# Patient Record
Sex: FEMALE | Race: WHITE | NOT HISPANIC OR LATINO | Employment: OTHER | URBAN - NONMETROPOLITAN AREA
[De-identification: names, ages, dates, MRNs, and addresses within clinical notes are randomized per-mention and may not be internally consistent; named-entity substitution may affect disease eponyms.]

---

## 2017-03-07 RX ORDER — FENOFIBRATE 160 MG/1
160 TABLET ORAL DAILY
COMMUNITY
End: 2022-04-11 | Stop reason: ALTCHOICE

## 2017-03-07 RX ORDER — ESOMEPRAZOLE MAGNESIUM 40 MG/1
40 CAPSULE, DELAYED RELEASE ORAL
COMMUNITY
End: 2019-12-17 | Stop reason: ALTCHOICE

## 2017-03-07 RX ORDER — FLUOXETINE HYDROCHLORIDE 40 MG/1
40 CAPSULE ORAL DAILY
COMMUNITY

## 2017-03-07 RX ORDER — MECLIZINE HCL 25MG 25 MG/1
25 TABLET, CHEWABLE ORAL 3 TIMES DAILY PRN
COMMUNITY

## 2017-03-07 RX ORDER — LEVOTHYROXINE AND LIOTHYRONINE 9.5; 2.25 UG/1; UG/1
15 TABLET ORAL DAILY
COMMUNITY
End: 2018-04-26 | Stop reason: DRUGHIGH

## 2017-03-07 RX ORDER — DIAPER,BRIEF,INFANT-TODD,DISP
EACH MISCELLANEOUS 2 TIMES DAILY
COMMUNITY
End: 2017-03-13

## 2017-03-07 RX ORDER — CARVEDILOL 12.5 MG/1
12.5 TABLET ORAL DAILY
COMMUNITY
End: 2020-06-18 | Stop reason: SDUPTHER

## 2017-03-07 RX ORDER — ASPIRIN 81 MG/1
81 TABLET ORAL DAILY
COMMUNITY

## 2017-03-07 RX ORDER — FUROSEMIDE 20 MG/1
20 TABLET ORAL AS NEEDED
COMMUNITY

## 2017-03-07 RX ORDER — TRAZODONE HYDROCHLORIDE 150 MG/1
100 TABLET ORAL NIGHTLY
COMMUNITY

## 2017-03-13 ENCOUNTER — OFFICE VISIT (OUTPATIENT)
Dept: CARDIOLOGY | Facility: CLINIC | Age: 57
End: 2017-03-13

## 2017-03-13 VITALS
HEIGHT: 69 IN | WEIGHT: 293 LBS | BODY MASS INDEX: 43.4 KG/M2 | HEART RATE: 73 BPM | DIASTOLIC BLOOD PRESSURE: 84 MMHG | OXYGEN SATURATION: 98 % | SYSTOLIC BLOOD PRESSURE: 140 MMHG

## 2017-03-13 DIAGNOSIS — E66.01 MORBID OBESITY DUE TO EXCESS CALORIES (HCC): ICD-10-CM

## 2017-03-13 DIAGNOSIS — E78.5 DYSLIPIDEMIA: ICD-10-CM

## 2017-03-13 DIAGNOSIS — I50.22 CHRONIC SYSTOLIC CONGESTIVE HEART FAILURE (HCC): Primary | ICD-10-CM

## 2017-03-13 DIAGNOSIS — I10 ESSENTIAL HYPERTENSION: ICD-10-CM

## 2017-03-13 PROBLEM — E03.9 HYPOTHYROID: Status: ACTIVE | Noted: 2017-03-13

## 2017-03-13 PROBLEM — R10.13 DYSPEPSIA: Status: ACTIVE | Noted: 2017-03-13

## 2017-03-13 PROBLEM — E11.9 DIABETES (HCC): Status: ACTIVE | Noted: 2017-03-13

## 2017-03-13 PROBLEM — F41.9 ANXIETY: Status: ACTIVE | Noted: 2017-03-13

## 2017-03-13 PROBLEM — F32.A DEPRESSION: Status: ACTIVE | Noted: 2017-03-13

## 2017-03-13 PROCEDURE — 99244 OFF/OP CNSLTJ NEW/EST MOD 40: CPT | Performed by: INTERNAL MEDICINE

## 2017-03-13 PROCEDURE — 93000 ELECTROCARDIOGRAM COMPLETE: CPT | Performed by: INTERNAL MEDICINE

## 2017-03-13 NOTE — PROGRESS NOTES
Reason for Visit: Establish cardiovascular care.    HPI:  Kacie Baron is a 57 y.o. female is here today as a transfer of care at the request of Dr Carlie Infante.  She was initially diagnosed with CHF in June 2013.  She was told her heart was slightly enlarged and weak.  It had later normalized on subsequent Echo.  She just moved to this area in December from [place], [place].  She has not had any recent heart problems that she is aware of.  She is tired intermittently, which is her only complaint.  She is unable to exercise much due to a medial meniscus tear.      Previous Cardiac Testing and Procedures:  - C (7/9/13) normal coronary arteries  - Lipid panel (12/2/16) 206/35/133/191    Patient Active Problem List   Diagnosis   • Depression   • Anxiety   • Morbid obesity due to excess calories   • Dyslipidemia   • Essential hypertension   • Dyspepsia   • Hypothyroid   • Diabetes       Social History   Substance Use Topics   • Smoking status: Never Smoker   • Smokeless tobacco: None   • Alcohol use No       Family History   Problem Relation Age of Onset   • Heart disease Mother        The following portions of the patient's history were reviewed and updated as appropriate: allergies, current medications, past family history, past medical history, past social history, past surgical history and problem list.      Current Outpatient Prescriptions:   •  aspirin 81 MG EC tablet, Take 81 mg by mouth Daily., Disp: , Rfl:   •  carvedilol (COREG) 12.5 MG tablet, Take 12.5 mg by mouth 2 (Two) Times a Day With Meals., Disp: , Rfl:   •  Cholecalciferol (VITAMIN D3) 5000 UNITS tablet, Take  by mouth., Disp: , Rfl:   •  esomeprazole (nexIUM) 40 MG capsule, Take 40 mg by mouth Every Morning Before Breakfast., Disp: , Rfl:   •  fenofibrate (TRICOR) 145 MG tablet, Take 145 mg by mouth Daily., Disp: , Rfl:   •  FLUoxetine (PROzac) 40 MG capsule, Take 40 mg by mouth Daily., Disp: , Rfl:   •  furosemide (LASIX) 20 MG tablet, Take  "20 mg by mouth 2 (Two) Times a Day., Disp: , Rfl:   •  meclizine 25 MG chewable tablet chewable tablet, Chew 25 mg 3 (Three) Times a Day As Needed., Disp: , Rfl:   •  metFORMIN (GLUCOPHAGE) 1000 MG tablet, Take 1,000 mg by mouth 2 (Two) Times a Day With Meals., Disp: , Rfl:   •  thyroid (ARMOUR) 15 MG tablet, Take 15 mg by mouth Daily., Disp: , Rfl:   •  traZODone (DESYREL) 75 MG half tablet, Take 75 mg by mouth Every Night., Disp: , Rfl:     Review of Systems   Constitution: Negative for chills, fever and weight loss.   HENT: Negative for headaches and sore throat.    Eyes: Negative for blurred vision and visual disturbance.   Cardiovascular: Negative for chest pain, dyspnea on exertion, leg swelling, palpitations, paroxysmal nocturnal dyspnea and syncope.   Respiratory: Negative for cough and shortness of breath.    Endocrine: Negative for cold intolerance and polyuria.   Skin: Negative for itching and rash.   Musculoskeletal: Negative for joint swelling and myalgias.   Gastrointestinal: Negative for abdominal pain, diarrhea and vomiting.   Genitourinary: Negative for dysuria and hematuria.   Neurological: Negative for dizziness and numbness.   Psychiatric/Behavioral: Negative for depression. The patient is not nervous/anxious.    Allergic/Immunologic: Negative for hives.       Objective   Visit Vitals   • /84 (BP Location: Right arm, Patient Position: Sitting, Cuff Size: Large Adult)   • Pulse 73   • Ht 68.5\" (174 cm)   • Wt (!) 309 lb (140 kg)   • SpO2 98%   • BMI 46.3 kg/m2     Physical Exam   Constitutional: She is oriented to person, place, and time. She appears well-developed and well-nourished.   HENT:   Head: Normocephalic and atraumatic.   Eyes: Conjunctivae and EOM are normal. Pupils are equal, round, and reactive to light.   Neck: Normal range of motion. Neck supple. No JVD present. No thyromegaly present.   Cardiovascular: Normal rate, regular rhythm and normal heart sounds.    No murmur " heard.  Pulmonary/Chest: Effort normal and breath sounds normal. She has no wheezes. She has no rales.   Abdominal: Soft. Bowel sounds are normal. She exhibits no distension. There is no tenderness.   Musculoskeletal: Normal range of motion. She exhibits no edema.   Neurological: She is alert and oriented to person, place, and time. Coordination normal.   Skin: Skin is warm and dry. No rash noted.   Psychiatric: She has a normal mood and affect. Her behavior is normal.       ECG 12 Lead  Date/Time: 3/13/2017 9:49 AM  Performed by: NELY LOPEZ  Authorized by: NELY LOPEZ   Previous ECG: no previous ECG available  Rhythm: sinus rhythm  Rate: normal  Clinical impression: normal ECG              ICD-10-CM ICD-9-CM   1. Chronic systolic congestive heart failure I50.22 428.22     428.0   2. Morbid obesity due to excess calories E66.01 278.01   3. Dyslipidemia E78.5 272.4   4. Essential hypertension I10 401.9         Assessment/Plan:  1. CHF: History of systolic CHF that reportedly normalized.  Requested records from her prior cardiologist in [place].       2. Dyslipidemia:  Currently managed on fenofibrate.  Not optimally controlled.  Will give trial of lifestyle modification.      3. Obesity:  BMI 46,  on diet, exercise, and weight loss.      4. Essential hypertension:  Blood pressure is borderline.  Will continue carvedilol and encourage exercise and lifestyle modification.

## 2017-10-16 ENCOUNTER — OFFICE VISIT (OUTPATIENT)
Dept: CARDIOLOGY | Facility: CLINIC | Age: 57
End: 2017-10-16

## 2017-10-16 VITALS
OXYGEN SATURATION: 98 % | DIASTOLIC BLOOD PRESSURE: 88 MMHG | SYSTOLIC BLOOD PRESSURE: 124 MMHG | BODY MASS INDEX: 43.4 KG/M2 | HEIGHT: 69 IN | WEIGHT: 293 LBS | HEART RATE: 89 BPM

## 2017-10-16 DIAGNOSIS — I10 ESSENTIAL HYPERTENSION: ICD-10-CM

## 2017-10-16 DIAGNOSIS — E66.01 MORBID OBESITY DUE TO EXCESS CALORIES (HCC): ICD-10-CM

## 2017-10-16 DIAGNOSIS — E78.5 DYSLIPIDEMIA: ICD-10-CM

## 2017-10-16 DIAGNOSIS — I50.9 CONGESTIVE HEART FAILURE, UNSPECIFIED CONGESTIVE HEART FAILURE CHRONICITY, UNSPECIFIED CONGESTIVE HEART FAILURE TYPE: Primary | ICD-10-CM

## 2017-10-16 PROCEDURE — 99214 OFFICE O/P EST MOD 30 MIN: CPT | Performed by: INTERNAL MEDICINE

## 2017-10-16 NOTE — PROGRESS NOTES
Reason for Visit: cardiovascular follow up.    HPI:  Kacie Baron is a 57 y.o. female is here today for follow-up.  She has done well for the most part.  She denies any chest pain, palpitations, dizziness, syncope, PND, or orthopnea.  Her only complaint is episodes of sweating.  She notes that even if it is cold outside she will break out in a sweat.  Her cholesterol remains elevated on the recheck.  She is very reluctant to go on statins due to her  who had significant muscle effects from them.  She once another try at lifestyle modification.    Previous Cardiac Testing and Procedures:  - LHC (7/9/13) normal coronary arteries  - Lipid panel (12/2/16) 206/35/133/191  - Lipid panel (10/10/17) 214/41/137/178      Patient Active Problem List   Diagnosis   • Depression   • Anxiety   • Morbid obesity due to excess calories   • Dyslipidemia   • Essential hypertension   • Dyspepsia   • Hypothyroid   • Diabetes       Social History   Substance Use Topics   • Smoking status: Never Smoker   • Smokeless tobacco: Never Used   • Alcohol use No       Family History   Problem Relation Age of Onset   • Heart disease Mother        The following portions of the patient's history were reviewed and updated as appropriate: allergies, current medications, past family history, past medical history, past social history, past surgical history and problem list.      Current Outpatient Prescriptions:   •  aspirin 81 MG EC tablet, Take 81 mg by mouth Daily., Disp: , Rfl:   •  carvedilol (COREG) 12.5 MG tablet, Take 12.5 mg by mouth 2 (Two) Times a Day With Meals., Disp: , Rfl:   •  Cholecalciferol (VITAMIN D3) 5000 UNITS tablet, Take  by mouth., Disp: , Rfl:   •  esomeprazole (nexIUM) 40 MG capsule, Take 40 mg by mouth Every Morning Before Breakfast., Disp: , Rfl:   •  fenofibrate (TRICOR) 145 MG tablet, Take 145 mg by mouth Daily., Disp: , Rfl:   •  FLUoxetine (PROzac) 40 MG capsule, Take 40 mg by mouth Daily., Disp: , Rfl:   •   "furosemide (LASIX) 20 MG tablet, Take 20 mg by mouth 2 (Two) Times a Day., Disp: , Rfl:   •  meclizine 25 MG chewable tablet chewable tablet, Chew 25 mg 3 (Three) Times a Day As Needed., Disp: , Rfl:   •  metFORMIN (GLUCOPHAGE) 1000 MG tablet, Take 1,000 mg by mouth 2 (Two) Times a Day With Meals., Disp: , Rfl:   •  thyroid (ARMOUR) 15 MG tablet, Take 15 mg by mouth Daily., Disp: , Rfl:   •  traZODone (DESYREL) 75 MG half tablet, Take 75 mg by mouth Every Night., Disp: , Rfl:     Review of Systems   Constitution: Negative for malaise/fatigue.   Eyes: Negative for blurred vision.   Cardiovascular: Negative for chest pain, orthopnea, palpitations and paroxysmal nocturnal dyspnea.   Respiratory: Negative for shortness of breath.    Musculoskeletal: Negative for neck pain.   Neurological: Negative for headaches.     Answers for HPI/ROS submitted by the patient on 10/9/2017   Hypertension  Chronicity: recurrent  Onset: more than 1 year ago  Progression since onset: waxing and waning  Condition status: controlled  anxiety: No  peripheral edema: No  sweats: Yes  Agents associated with hypertension: thyroid hormones  CAD risks: diabetes mellitus, dyslipidemia, family history, obesity, post-menopausal state, sedentary lifestyle  Compliance problems: diet, exercise    Objective   /88 (BP Location: Left arm, Patient Position: Sitting, Cuff Size: Large Adult)  Pulse 89  Ht 68.5\" (174 cm)  Wt (!) 303 lb (137 kg)  SpO2 98%  BMI 45.4 kg/m2  Physical Exam   Constitutional: She is oriented to person, place, and time. She appears well-developed and well-nourished.   HENT:   Head: Normocephalic and atraumatic.   Cardiovascular: Normal rate, regular rhythm and normal heart sounds.    No murmur heard.  Pulmonary/Chest: Effort normal and breath sounds normal.   Musculoskeletal: She exhibits no edema.   Neurological: She is alert and oriented to person, place, and time.   Skin: Skin is warm and dry.   Psychiatric: She has a " normal mood and affect.     Procedures      ICD-10-CM ICD-9-CM   1. Congestive heart failure, unspecified congestive heart failure chronicity, unspecified congestive heart failure type I50.9 428.0   2. Dyslipidemia E78.5 272.4   3. Morbid obesity due to excess calories E66.01 278.01   4. Essential hypertension I10 401.9         Assessment/Plan:  1. CHF: History of systolic CHF that reportedly normalized.   will again request echo records from her prior cardiologist in [place].    continue carvedilol.     2. Dyslipidemia:  Currently managed on fenofibrate and remains not optimally controlled.   on lifestyle modification.  Patient will try Mediterranean-style diet along with exercise.  If no improvement will consider statin therapy.     3. Obesity:  BMI 45.  Weight is down 6 pounds from last visit.  Continue to  on diet, exercise, and weight loss.       4. Essential hypertension:   Diastolic blood pressure is borderline today.  We will continue lifestyle modification and monitor.

## 2018-01-18 ENCOUNTER — TRANSCRIBE ORDERS (OUTPATIENT)
Dept: ADMINISTRATIVE | Facility: HOSPITAL | Age: 58
End: 2018-01-18

## 2018-01-18 DIAGNOSIS — Z12.31 ENCOUNTER FOR SCREENING MAMMOGRAM FOR MALIGNANT NEOPLASM OF BREAST: Primary | ICD-10-CM

## 2018-01-24 ENCOUNTER — APPOINTMENT (OUTPATIENT)
Dept: MAMMOGRAPHY | Facility: HOSPITAL | Age: 58
End: 2018-01-24

## 2018-02-16 ENCOUNTER — HOSPITAL ENCOUNTER (OUTPATIENT)
Dept: MAMMOGRAPHY | Facility: HOSPITAL | Age: 58
Discharge: HOME | End: 2018-02-16
Admitting: NURSE PRACTITIONER

## 2018-02-16 ENCOUNTER — TRANSCRIBE ORDERS (OUTPATIENT)
Dept: ADMINISTRATIVE | Facility: HOSPITAL | Age: 58
End: 2018-02-16

## 2018-02-16 ENCOUNTER — HOSPITAL ENCOUNTER (OUTPATIENT)
Dept: ULTRASOUND IMAGING | Facility: HOSPITAL | Age: 58
Discharge: HOME | End: 2018-02-16

## 2018-02-16 DIAGNOSIS — M76.62 TENDONITIS, ACHILLES, LEFT: Primary | ICD-10-CM

## 2018-02-16 DIAGNOSIS — M76.62 TENDONITIS, ACHILLES, LEFT: ICD-10-CM

## 2018-02-16 DIAGNOSIS — Z12.31 ENCOUNTER FOR SCREENING MAMMOGRAM FOR MALIGNANT NEOPLASM OF BREAST: ICD-10-CM

## 2018-02-16 PROCEDURE — 77067 SCR MAMMO BI INCL CAD: CPT

## 2018-02-16 PROCEDURE — 77063 BREAST TOMOSYNTHESIS BI: CPT

## 2018-02-16 PROCEDURE — 76882 US LMTD JT/FCL EVL NVASC XTR: CPT

## 2018-04-26 ENCOUNTER — OFFICE VISIT (OUTPATIENT)
Dept: CARDIOLOGY | Facility: CLINIC | Age: 58
End: 2018-04-26

## 2018-04-26 VITALS
SYSTOLIC BLOOD PRESSURE: 135 MMHG | HEIGHT: 68 IN | WEIGHT: 293 LBS | RESPIRATION RATE: 18 BRPM | BODY MASS INDEX: 44.41 KG/M2 | HEART RATE: 77 BPM | DIASTOLIC BLOOD PRESSURE: 80 MMHG

## 2018-04-26 DIAGNOSIS — I10 ESSENTIAL HYPERTENSION: Primary | ICD-10-CM

## 2018-04-26 DIAGNOSIS — E78.5 DYSLIPIDEMIA: ICD-10-CM

## 2018-04-26 DIAGNOSIS — E11.9 TYPE 2 DIABETES MELLITUS WITHOUT COMPLICATION, WITHOUT LONG-TERM CURRENT USE OF INSULIN (HCC): ICD-10-CM

## 2018-04-26 DIAGNOSIS — E66.01 MORBID OBESITY DUE TO EXCESS CALORIES (HCC): ICD-10-CM

## 2018-04-26 PROCEDURE — 93000 ELECTROCARDIOGRAM COMPLETE: CPT | Performed by: NURSE PRACTITIONER

## 2018-04-26 PROCEDURE — 99214 OFFICE O/P EST MOD 30 MIN: CPT | Performed by: NURSE PRACTITIONER

## 2018-04-26 NOTE — PROGRESS NOTES
"    Subjective:     Encounter Date:04/26/2018      Patient ID: Kacie Baron is a 58 y.o. female.    Chief Complaint:  The patient reports she is feeling well overall. She admits occasional drops in her blood pressure to 100s/50s, typically 1-2 hours after taking her blood pressure medication. She has some mild lightheadedness with this. She admits worsening lower extremity edema during the summer time, but none presently. She denies chest pain, shortness of breath, palpitations, sudden weight gain, orthopnea, PND or syncope. SHe reports she cannot exercise very much due to trouble with her left ankle.       Congestive Heart Failure   Presents for follow-up visit. Pertinent negatives include no abdominal pain, chest pain, chest pressure, claudication, edema, fatigue, muscle weakness, near-syncope, palpitations, paroxysmal nocturnal dyspnea, shortness of breath or unexpected weight change. The symptoms have been stable.       The following portions of the patient's history were reviewed and updated as appropriate: allergies, current medications, past family history, past medical history, past social history, past surgical history and problem list.  /80 (BP Location: Right arm, Patient Position: Sitting, Cuff Size: Adult)   Pulse 77   Resp 18   Ht 172.7 cm (68\")   Wt (!) 140 kg (308 lb)   Breastfeeding? No   BMI 46.83 kg/m²   Allergies   Allergen Reactions   • Lisinopril Nausea Only       Current Outpatient Prescriptions:   •  aspirin 81 MG EC tablet, Take 81 mg by mouth Daily., Disp: , Rfl:   •  carvedilol (COREG) 12.5 MG tablet, Take 12.5 mg by mouth 2 (Two) Times a Day With Meals., Disp: , Rfl:   •  Cholecalciferol (VITAMIN D3) 5000 UNITS tablet, Take  by mouth., Disp: , Rfl:   •  esomeprazole (nexIUM) 40 MG capsule, Take 40 mg by mouth Every Morning Before Breakfast., Disp: , Rfl:   •  fenofibrate (TRICOR) 145 MG tablet, Take 145 mg by mouth Daily., Disp: , Rfl:   •  FLUoxetine (PROzac) 40 MG capsule, " Take 40 mg by mouth Daily., Disp: , Rfl:   •  furosemide (LASIX) 20 MG tablet, Take 20 mg by mouth As Needed., Disp: , Rfl:   •  meclizine 25 MG chewable tablet chewable tablet, Chew 25 mg 3 (Three) Times a Day As Needed., Disp: , Rfl:   •  metFORMIN (GLUCOPHAGE) 1000 MG tablet, Take 1,000 mg by mouth 2 (Two) Times a Day With Meals., Disp: , Rfl:   •  Thyroid 90 MG PO tablet, Take 90 mg by mouth Daily., Disp: , Rfl:   •  traZODone (DESYREL) 75 MG half tablet, Take 75 mg by mouth Every Night., Disp: , Rfl:   Past Medical History:   Diagnosis Date   • Arthritis    • CHF (congestive heart failure)    • Depression    • DM (diabetes mellitus screen)    • GERD (gastroesophageal reflux disease)    • HLD (hyperlipidemia)    • HTN (hypertension)    • Thyroid condition      Past Surgical History:   Procedure Laterality Date   • BACK SURGERY      2007   • CARDIAC CATHETERIZATION     • COLONOSCOPY      2016   • CYST REMOVAL     • DILATATION AND CURETTAGE     • TONSILLECTOMY AND ADENOIDECTOMY     • TUBAL ABDOMINAL LIGATION       Social History     Social History   • Marital status: Unknown     Spouse name: N/A   • Number of children: N/A   • Years of education: N/A     Occupational History   • Not on file.     Social History Main Topics   • Smoking status: Never Smoker   • Smokeless tobacco: Never Used   • Alcohol use No   • Drug use: Unknown   • Sexual activity: Yes     Partners: Male     Other Topics Concern   • Not on file     Social History Narrative   • No narrative on file     Family History   Problem Relation Age of Onset   • Heart disease Mother    • Breast cancer Maternal Grandmother    • Breast cancer Paternal Grandmother        Review of Systems   Constitution: Negative for chills, diaphoresis, fatigue, fever, weakness and unexpected weight change.   HENT: Negative for nosebleeds.    Eyes: Negative for visual disturbance.   Cardiovascular: Negative for chest pain, claudication, cyanosis, dyspnea on exertion, irregular  heartbeat, leg swelling, near-syncope, orthopnea, palpitations, paroxysmal nocturnal dyspnea and syncope.   Respiratory: Negative for cough, hemoptysis, shortness of breath, sputum production and wheezing.    Hematologic/Lymphatic: Negative for bleeding problem.   Skin: Negative for color change and flushing.   Musculoskeletal: Negative for falls and muscle weakness.   Gastrointestinal: Negative for bloating, abdominal pain, hematemesis, hematochezia, melena, nausea and vomiting.   Genitourinary: Negative for hematuria.   Neurological: Positive for light-headedness (occasional with low blood pressure ). Negative for dizziness.   Psychiatric/Behavioral: Negative for altered mental status.         ECG 12 Lead  Date/Time: 4/26/2018 9:39 AM  Performed by: WES SALCEDO  Authorized by: WES SALCEDO   Comparison: compared with previous ECG from 3/13/2017  Similar to previous ECG  Rhythm: sinus rhythm               Objective:     Physical Exam   Constitutional: She is oriented to person, place, and time. She appears well-developed and well-nourished. No distress.   HENT:   Head: Normocephalic and atraumatic.   Eyes: Pupils are equal, round, and reactive to light.   Neck: Normal range of motion. Neck supple. No JVD present. No thyromegaly present.   Cardiovascular: Normal rate, regular rhythm, normal heart sounds and intact distal pulses.  Exam reveals no gallop and no friction rub.    No murmur heard.  Pulmonary/Chest: Effort normal and breath sounds normal. No respiratory distress. She has no wheezes. She has no rales. She exhibits no tenderness.   Abdominal: Soft. Bowel sounds are normal. She exhibits no distension. There is no tenderness.   Musculoskeletal: Normal range of motion. She exhibits no edema.   Neurological: She is alert and oriented to person, place, and time. No cranial nerve deficit.   Skin: Skin is warm and dry. She is not diaphoretic.   Psychiatric: She has a normal mood and affect. Her behavior is  normal.     Patient's Body mass index is 46.83 kg/m². BMI is above normal parameters. Follow-up plan includes:  exercise counseling and nutrition counseling.  Lab Review:       Assessment:          Diagnosis Plan   1. Essential hypertension  Controlled per home readings, occasional low blood pressures- encouraged hydration, orthostatic precautions reviewed     2. Morbid obesity due to excess calories  Counseled on the importance and benefits of weight loss, heart healthy diet, and exercise   3. Type 2 diabetes mellitus without complication, without long-term current use of insulin  Followed by pcp    4. Dyslipidemia  Remains uncontrolled. She reports she did not tolerate lipitor 20 mg daily due to upset stomach. Repeat lipid panel today- if she remains uncontrolled will try different statin, possibly pravastatin      5. Reported history of systolic CHF/non ischemic cardiomyopathy- she reports she had a normal heart cath in [place], [place] at [facility] per Dr. Jonnathan Turner- will request records again. LVEF normalized per 2014 echo- LVEF 60%. No current s/s of CHF.      Plan:       As noted above.  Continue ASA, coreg, PRN lasix, tricor. She is allergic to ACE inhibitors.   Check lipid panel     Reviewed signs and symptoms of CHF and what to report with the patient. Patient instructed to restrict sodium and weigh daily. Report weight gain of greater than 2 lbs overnight or 5 lbs in 1 week. Pt verbalized understanding of instructions and plan of care.

## 2018-04-26 NOTE — PATIENT INSTRUCTIONS
"Heart-Healthy Eating Plan  Heart-healthy meal planning includes:  · Limiting unhealthy fats.  · Increasing healthy fats.  · Making other small dietary changes.  You may need to talk with your doctor or a diet specialist (dietitian) to create an eating plan that is right for you.  What types of fat should I choose?  · Choose healthy fats. These include olive oil and canola oil, flaxseeds, walnuts, almonds, and seeds.  · Eat more omega-3 fats. These include salmon, mackerel, sardines, tuna, flaxseed oil, and ground flaxseeds. Try to eat fish at least twice each week.  · Limit saturated fats.  ¨ Saturated fats are often found in animal products, such as meats, butter, and cream.  ¨ Plant sources of saturated fats include palm oil, palm kernel oil, and coconut oil.  · Avoid foods with partially hydrogenated oils in them. These include stick margarine, some tub margarines, cookies, crackers, and other baked goods. These contain trans fats.  What general guidelines do I need to follow?  · Check food labels carefully. Identify foods with trans fats or high amounts of saturated fat.  · Fill one half of your plate with vegetables and green salads. Eat 4-5 servings of vegetables per day. A serving of vegetables is:  ¨ 1 cup of raw leafy vegetables.  ¨ ½ cup of raw or cooked cut-up vegetables.  ¨ ½ cup of vegetable juice.  · Fill one fourth of your plate with whole grains. Look for the word \"whole\" as the first word in the ingredient list.  · Fill one fourth of your plate with lean protein foods.  · Eat 4-5 servings of fruit per day. A serving of fruit is:  ¨ One medium whole fruit.  ¨ ¼ cup of dried fruit.  ¨ ½ cup of fresh, frozen, or canned fruit.  ¨ ½ cup of 100% fruit juice.  · Eat more foods that contain soluble fiber. These include apples, broccoli, carrots, beans, peas, and barley. Try to get 20-30 g of fiber per day.  · Eat more home-cooked food. Eat less restaurant, buffet, and fast food.  · Limit or avoid " alcohol.  · Limit foods high in starch and sugar.  · Avoid fried foods.  · Avoid frying your food. Try baking, boiling, grilling, or broiling it instead. You can also reduce fat by:  ¨ Removing the skin from poultry.  ¨ Removing all visible fats from meats.  ¨ Skimming the fat off of stews, soups, and gravies before serving them.  ¨ Steaming vegetables in water or broth.  · Lose weight if you are overweight.  · Eat 4-5 servings of nuts, legumes, and seeds per week:  ¨ One serving of dried beans or legumes equals ½ cup after being cooked.  ¨ One serving of nuts equals 1½ ounces.  ¨ One serving of seeds equals ½ ounce or one tablespoon.  · You may need to keep track of how much salt or sodium you eat. This is especially true if you have high blood pressure. Talk with your doctor or dietitian to get more information.  What foods can I eat?  Grains   Breads, including Scottish, white, marshal, wheat, raisin, rye, oatmeal, and Italian. Tortillas that are neither fried nor made with lard or trans fat. Low-fat rolls, including hotdog and hamburger buns and English muffins. Biscuits. Muffins. Waffles. Pancakes. Light popcorn. Whole-grain cereals. Flatbread. [place] toast. Pretzels. Breadsticks. Rusks. Low-fat snacks. Low-fat crackers, including oyster, saltine, matzo, geraldo, animal, and rye. Rice and pasta, including brown rice and pastas that are made with whole wheat.  Vegetables   All vegetables.  Fruits   All fruits, but limit coconut.  Meats and Other Protein Sources   Lean, well-trimmed beef, veal, pork, and lamb. Chicken and turkey without skin. All fish and shellfish. Wild duck, rabbit, pheasant, and venison. Egg whites or low-cholesterol egg substitutes. Dried beans, peas, lentils, and tofu. Seeds and most nuts.  Dairy   Low-fat or nonfat cheeses, including ricotta, string, and mozzarella. Skim or 1% milk that is liquid, powdered, or evaporated. Buttermilk that is made with low-fat milk. Nonfat or low-fat  yogurt.  Beverages   Mineral water. Diet carbonated beverages.  Sweets and Desserts   Sherbets and fruit ices. Honey, jam, marmalade, jelly, and syrups. Meringues and gelatins. Pure sugar candy, such as hard candy, jelly beans, gumdrops, mints, marshmallows, and small amounts of dark chocolate. Jhon food cake.  Eat all sweets and desserts in moderation.  Fats and Oils   Nonhydrogenated (trans-free) margarines. Vegetable oils, including soybean, sesame, sunflower, olive, peanut, safflower, corn, canola, and cottonseed. Salad dressings or mayonnaise made with a vegetable oil. Limit added fats and oils that you use for cooking, baking, salads, and as spreads.  Other   Cocoa powder. Coffee and tea. All seasonings and condiments.  The items listed above may not be a complete list of recommended foods or beverages. Contact your dietitian for more options.   What foods are not recommended?  Grains   Breads that are made with saturated or trans fats, oils, or whole milk. Croissants. Butter rolls. Cheese breads. Sweet rolls. Donuts. Buttered popcorn. Chow mein noodles. High-fat crackers, such as cheese or butter crackers.  Meats and Other Protein Sources   Fatty meats, such as hotdogs, short ribs, sausage, spareribs, cortes, rib eye roast or steak, and mutton. High-fat deli meats, such as salami and bologna. Caviar. Domestic duck and goose. Organ meats, such as kidney, liver, sweetbreads, and heart.  Dairy   Cream, sour cream, cream cheese, and creamed cottage cheese. Whole-milk cheeses, including blue (anayeli), [place], Brie, Kareem, American, Havarti, Swiss, cheddar, Camembert, and [place]. Whole or 2% milk that is liquid, evaporated, or condensed. Whole buttermilk. Cream sauce or high-fat cheese sauce. Yogurt that is made from whole milk.  Beverages   Regular sodas and juice drinks with added sugar.  Sweets and Desserts   Frosting. Pudding. Cookies. Cakes other than jhon food cake. Candy that has milk chocolate or  white chocolate, hydrogenated fat, butter, coconut, or unknown ingredients. Buttered syrups. Full-fat ice cream or ice cream drinks.  Fats and Oils   Gravy that has suet, meat fat, or shortening. Cocoa butter, hydrogenated oils, palm oil, coconut oil, palm kernel oil. These can often be found in baked products, candy, fried foods, nondairy creamers, and whipped toppings. Solid fats and shortenings, including cortes fat, salt pork, lard, and butter. Nondairy cream substitutes, such as coffee creamers and sour cream substitutes. Salad dressings that are made of unknown oils, cheese, or sour cream.  The items listed above may not be a complete list of foods and beverages to avoid. Contact your dietitian for more information.   This information is not intended to replace advice given to you by your health care provider. Make sure you discuss any questions you have with your health care provider.  Document Released: 06/18/2013 Document Revised: 05/25/2017 Document Reviewed: 06/11/2015  [employer] Interactive Patient Education © 2017 [employer]

## 2018-10-26 ENCOUNTER — OFFICE VISIT (OUTPATIENT)
Dept: CARDIOLOGY | Facility: CLINIC | Age: 58
End: 2018-10-26

## 2018-10-26 VITALS
WEIGHT: 293 LBS | SYSTOLIC BLOOD PRESSURE: 130 MMHG | DIASTOLIC BLOOD PRESSURE: 86 MMHG | OXYGEN SATURATION: 98 % | HEART RATE: 86 BPM | HEIGHT: 69 IN | BODY MASS INDEX: 43.4 KG/M2

## 2018-10-26 DIAGNOSIS — I10 ESSENTIAL HYPERTENSION: Primary | ICD-10-CM

## 2018-10-26 DIAGNOSIS — E78.5 DYSLIPIDEMIA: ICD-10-CM

## 2018-10-26 DIAGNOSIS — E66.01 MORBID OBESITY DUE TO EXCESS CALORIES (HCC): ICD-10-CM

## 2018-10-26 DIAGNOSIS — E11.9 TYPE 2 DIABETES MELLITUS WITHOUT COMPLICATION, WITHOUT LONG-TERM CURRENT USE OF INSULIN (HCC): ICD-10-CM

## 2018-10-26 DIAGNOSIS — G47.39 OTHER SLEEP APNEA: ICD-10-CM

## 2018-10-26 PROCEDURE — 93000 ELECTROCARDIOGRAM COMPLETE: CPT | Performed by: NURSE PRACTITIONER

## 2018-10-26 PROCEDURE — 99214 OFFICE O/P EST MOD 30 MIN: CPT | Performed by: NURSE PRACTITIONER

## 2018-10-26 RX ORDER — LOSARTAN POTASSIUM 25 MG/1
25 TABLET ORAL DAILY
COMMUNITY
End: 2019-12-17 | Stop reason: SDUPTHER

## 2018-10-26 RX ORDER — HYDROCORTISONE, IODOQUINOL 10; 10 MG/G; MG/G
1 CREAM TOPICAL EVERY 12 HOURS SCHEDULED
COMMUNITY
End: 2019-04-24

## 2018-10-26 NOTE — PROGRESS NOTES
Subjective:     Encounter Date:10/26/2018      Patient ID: Kacie Baron is a 58 y.o. female with a history of hypertension, diabetes mellitus, dyslipidemia, obstructive sleep apnea, and obesity. She presents today for follow up.     Chief Complaint: Follow up  Palpitations    This is a new problem. The current episode started more than 1 month ago. The problem occurs intermittently. The problem has been gradually worsening. Associated symptoms include malaise/fatigue. Pertinent negatives include no chest pain, diaphoresis, dizziness, nausea, near-syncope, shortness of breath, syncope or vomiting. Risk factors include diabetes mellitus, dyslipidemia and obesity.   Fatigue   This is a chronic problem. The problem occurs daily. The problem has been gradually worsening. Associated symptoms include fatigue. Pertinent negatives include no abdominal pain, chest pain, diaphoresis, headaches, joint swelling, nausea or vomiting. The symptoms are aggravated by eating. She has tried rest for the symptoms.     Ms. Baron presents today for routine follow up. She complains of fatigue that is worse after eating meals. She has not had any other work up recently for this complaint. She is due to see her PCP next month. She has only recently had labs to check her HgbA1c which was 7.3.  She denies chest pain or discomfort, shortness of breath, dyspnea on exertion, dizziness, headaches, or lightheadedness.   She has been trying to lose weight with dieting. She monitors her blood pressure at home and states that it is well controlled.     The following portions of the patient's history were reviewed and updated as appropriate: allergies, current medications, past family history, past medical history, past social history and past surgical history.     Allergies   Allergen Reactions   • Lisinopril Nausea Only       Current Outpatient Prescriptions:   •  aspirin 81 MG EC tablet, Take 81 mg by mouth Daily., Disp: , Rfl:   •  carvedilol  (COREG) 12.5 MG tablet, Take 12.5 mg by mouth 2 (Two) Times a Day With Meals., Disp: , Rfl:   •  Cholecalciferol (VITAMIN D3) 5000 UNITS tablet, Take  by mouth., Disp: , Rfl:   •  esomeprazole (nexIUM) 40 MG capsule, Take 40 mg by mouth Every Morning Before Breakfast., Disp: , Rfl:   •  fenofibrate (TRICOR) 145 MG tablet, Take 145 mg by mouth Daily., Disp: , Rfl:   •  FLUoxetine (PROzac) 40 MG capsule, Take 40 mg by mouth Daily., Disp: , Rfl:   •  furosemide (LASIX) 20 MG tablet, Take 20 mg by mouth As Needed., Disp: , Rfl:   •  Iodoquinol-HC (HYDROCORTISONE-IODOQUINONE) 1-1 % cream cream, Apply 1 application topically to the appropriate area as directed Every 12 (Twelve) Hours., Disp: , Rfl:   •  losartan (COZAAR) 25 MG tablet, Take 25 mg by mouth Daily., Disp: , Rfl:   •  meclizine 25 MG chewable tablet chewable tablet, Chew 25 mg 3 (Three) Times a Day As Needed., Disp: , Rfl:   •  metFORMIN (GLUCOPHAGE) 1000 MG tablet, Take 1,000 mg by mouth 2 (Two) Times a Day With Meals., Disp: , Rfl:   •  Thyroid 90 MG PO tablet, Take 90 mg by mouth Daily., Disp: , Rfl:   •  traZODone (DESYREL) 75 MG half tablet, Take 100 mg by mouth Every Night., Disp: , Rfl:     Past Medical History:   Diagnosis Date   • Arthritis    • CHF (congestive heart failure) (CMS/HCC)    • Depression    • DM (diabetes mellitus screen)    • GERD (gastroesophageal reflux disease)    • HLD (hyperlipidemia)    • HTN (hypertension)    • Thyroid condition      Family History   Problem Relation Age of Onset   • Heart disease Mother    • Breast cancer Maternal Grandmother    • Breast cancer Paternal Grandmother      Social History     Social History   • Marital status: Unknown     Spouse name: N/A   • Number of children: N/A   • Years of education: N/A     Occupational History   • Not on file.     Social History Main Topics   • Smoking status: Never Smoker   • Smokeless tobacco: Never Used   • Alcohol use No   • Drug use: Unknown   • Sexual activity: Yes      Partners: Male     Other Topics Concern   • Not on file     Social History Narrative   • No narrative on file     Past Surgical History:   Procedure Laterality Date   • BACK SURGERY      2007   • CARDIAC CATHETERIZATION     • COLONOSCOPY      2016   • CYST REMOVAL     • DILATATION AND CURETTAGE     • TONSILLECTOMY AND ADENOIDECTOMY     • TUBAL ABDOMINAL LIGATION       Review of Systems   Constitution: Positive for fatigue and malaise/fatigue. Negative for diaphoresis.   Cardiovascular: Positive for palpitations. Negative for chest pain, dyspnea on exertion, leg swelling, near-syncope, orthopnea, paroxysmal nocturnal dyspnea and syncope.   Respiratory: Negative for shortness of breath and wheezing.    Hematologic/Lymphatic: Negative for bleeding problem.   Skin: Negative for dry skin and flushing.   Musculoskeletal: Positive for joint pain. Negative for joint swelling.   Gastrointestinal: Negative for bloating, abdominal pain, nausea and vomiting.   Neurological: Negative for dizziness, headaches and light-headedness.   Psychiatric/Behavioral: Negative for altered mental status.   All other systems reviewed and are negative.        ECG 12 Lead  Date/Time: 10/26/2018 9:44 AM  Performed by: JOSE BRUCE  Authorized by: JOSE BRUCE   Comparison: compared with previous ECG from 4/26/2018  Rhythm: sinus rhythm  Rate: normal  BPM: 82  Conduction: conduction normal  T depression: V3, V2, V4 and V5  QRS axis: left  Clinical impression: abnormal ECG          Vitals:    10/26/18 0912   BP: 130/86   Pulse: 86   SpO2: 98%     1    10/26/18  0912   Weight: (!) 138 kg (304 lb)          Objective:     Physical Exam   Constitutional: She is oriented to person, place, and time. She appears well-developed and well-nourished. No distress.   HENT:   Head: Normocephalic and atraumatic.   Nose: Nose normal.   Mouth/Throat: Oropharynx is clear and moist. No oropharyngeal exudate.   Eyes: Conjunctivae are normal. No scleral icterus.    Neck: Normal range of motion. Neck supple.   Cardiovascular: Normal rate, regular rhythm and normal heart sounds.  Exam reveals no gallop and no friction rub.    No murmur heard.  Pulmonary/Chest: Effort normal and breath sounds normal. No respiratory distress. She has no wheezes. She has no rales.   Abdominal: Soft. Normal appearance. She exhibits no distension. There is no tenderness.   Musculoskeletal: Normal range of motion. She exhibits no edema.   Neurological: She is alert and oriented to person, place, and time.   Skin: Skin is warm, dry and intact. No rash noted. She is not diaphoretic. No erythema. No pallor.   Psychiatric: She has a normal mood and affect. Her behavior is normal.   Vitals reviewed.      Lab Review: reviewed cath records from 2013 - nl cors. Nl LV function      Assessment:          Diagnosis Plan   1. Essential hypertension     2. Dyslipidemia     3. Type 2 diabetes mellitus without complication, without long-term current use of insulin (CMS/[facility])     4. Morbid obesity due to excess calories (CMS/[facility])     5. Other sleep apnea            Plan:       - HTN: well controlled with home medication regimen. Continue same. Managed per PCP.    - Dyslipidemia: managed per PCP. On Tricor. Issues with intolerance to lipitor; has not tried other statins. Has lipid panel with PCP office soon.    - DM: reports elevated HgbA1c: on metformin. Managed per PCP    - Obesity: discussed diet and weight loss    - sleep apnea: compliant with mask at home.     RTC: follow up in 6 months.

## 2018-10-26 NOTE — PATIENT INSTRUCTIONS

## 2019-04-24 ENCOUNTER — OFFICE VISIT (OUTPATIENT)
Dept: CARDIOLOGY | Facility: CLINIC | Age: 59
End: 2019-04-24

## 2019-04-24 VITALS
DIASTOLIC BLOOD PRESSURE: 90 MMHG | HEIGHT: 68 IN | BODY MASS INDEX: 44.41 KG/M2 | HEART RATE: 67 BPM | WEIGHT: 293 LBS | SYSTOLIC BLOOD PRESSURE: 138 MMHG

## 2019-04-24 DIAGNOSIS — G47.39 OTHER SLEEP APNEA: ICD-10-CM

## 2019-04-24 DIAGNOSIS — E78.2 MIXED HYPERLIPIDEMIA: ICD-10-CM

## 2019-04-24 DIAGNOSIS — I10 ESSENTIAL HYPERTENSION: Primary | ICD-10-CM

## 2019-04-24 DIAGNOSIS — E66.01 CLASS 3 SEVERE OBESITY DUE TO EXCESS CALORIES WITH SERIOUS COMORBIDITY AND BODY MASS INDEX (BMI) OF 45.0 TO 49.9 IN ADULT (HCC): ICD-10-CM

## 2019-04-24 DIAGNOSIS — Z86.79 HISTORY OF CONGESTIVE HEART FAILURE IN ADULTHOOD: ICD-10-CM

## 2019-04-24 DIAGNOSIS — E11.8 TYPE 2 DIABETES MELLITUS WITH COMPLICATION, WITHOUT LONG-TERM CURRENT USE OF INSULIN (HCC): ICD-10-CM

## 2019-04-24 PROBLEM — I50.9 CHF (CONGESTIVE HEART FAILURE) (HCC): Status: ACTIVE | Noted: 2019-04-24

## 2019-04-24 PROBLEM — E78.5 HLD (HYPERLIPIDEMIA): Status: ACTIVE | Noted: 2019-04-24

## 2019-04-24 PROBLEM — E66.813 CLASS 3 SEVERE OBESITY DUE TO EXCESS CALORIES WITH SERIOUS COMORBIDITY AND BODY MASS INDEX (BMI) OF 45.0 TO 49.9 IN ADULT: Status: ACTIVE | Noted: 2017-03-13

## 2019-04-24 PROCEDURE — 93000 ELECTROCARDIOGRAM COMPLETE: CPT | Performed by: NURSE PRACTITIONER

## 2019-04-24 PROCEDURE — 99213 OFFICE O/P EST LOW 20 MIN: CPT | Performed by: NURSE PRACTITIONER

## 2019-04-24 NOTE — PATIENT INSTRUCTIONS
Obesity, Adult  Obesity is the condition of having too much total body fat. Being overweight or obese means that your weight is greater than what is considered healthy for your body size. Obesity is determined by a measurement called BMI. BMI is an estimate of body fat and is calculated from height and weight. For adults, a BMI of 30 or higher is considered obese.  Obesity can eventually lead to other health concerns and major illnesses, including:  · Stroke.  · Coronary artery disease (CAD).  · Type 2 diabetes.  · Some types of cancer, including cancers of the colon, breast, uterus, and gallbladder.  · Osteoarthritis.  · High blood pressure (hypertension).  · High cholesterol.  · Sleep apnea.  · Gallbladder stones.  · Infertility problems.    What are the causes?  The main cause of obesity is taking in (consuming) more calories than your body uses for energy. Other factors that contribute to this condition may include:  · Being born with genes that make you more likely to become obese.  · Having a medical condition that causes obesity. These conditions include:  ? Hypothyroidism.  ? Polycystic ovarian syndrome (PCOS).  ? Binge-eating disorder.  ? Cushing syndrome.  · Taking certain medicines, such as steroids, antidepressants, and seizure medicines.  · Not being physically active (sedentary lifestyle).  · Living where there are limited places to exercise safely or buy healthy foods.  · Not getting enough sleep.    What increases the risk?  The following factors may increase your risk of this condition:  · Having a family history of obesity.  · Being a woman of -American descent.  · Being a man of  descent.    What are the signs or symptoms?  Having excessive body fat is the main symptom of this condition.  How is this diagnosed?  This condition may be diagnosed based on:  · Your symptoms.  · Your medical history.  · A physical exam. Your health care provider may measure:  ? Your BMI. If you are an  adult with a BMI between 25 and less than 30, you are considered overweight. If you are an adult with a BMI of 30 or higher, you are considered obese.  ? The distances around your hips and your waist (circumferences). These may be compared to each other to help diagnose your condition.  ? Your skinfold thickness. Your health care provider may gently pinch a fold of your skin and measure it.    How is this treated?  Treatment for this condition often includes changing your lifestyle. Treatment may include some or all of the following:  · Dietary changes. Work with your health care provider and a dietitian to set a weight-loss goal that is healthy and reasonable for you. Dietary changes may include eating:  ? Smaller portions. A portion size is the amount of a particular food that is healthy for you to eat at one time. This varies from person to person.  ? Low-calorie or low-fat options.  ? More whole grains, fruits, and vegetables.  · Regular physical activity. This may include aerobic activity (cardio) and strength training.  · Medicine to help you lose weight. Your health care provider may prescribe medicine if you are unable to lose 1 pound a week after 6 weeks of eating more healthily and doing more physical activity.  · Surgery. Surgical options may include gastric banding and gastric bypass. Surgery may be done if:  ? Other treatments have not helped to improve your condition.  ? You have a BMI of 40 or higher.  ? You have life-threatening health problems related to obesity.    Follow these instructions at home:    Eating and drinking    · Follow recommendations from your health care provider about what you eat and drink. Your health care provider may advise you to:  ? Limit fast foods, sweets, and processed snack foods.  ? Choose low-fat options, such as low-fat milk instead of whole milk.  ? Eat 5 or more servings of fruits or vegetables every day.  ? Eat at home more often. This gives you more control over  what you eat.  ? Choose healthy foods when you eat out.  ? Learn what a healthy portion size is.  ? Keep low-fat snacks on hand.  ? Avoid sugary drinks, such as soda, fruit juice, iced tea sweetened with sugar, and flavored milk.  ? Eat a healthy breakfast.  · Drink enough water to keep your urine clear or pale yellow.  · Do not go without eating for long periods of time (do not fast) or follow a fad diet. Fasting and fad diets can be unhealthy and even dangerous.  Physical Activity  · Exercise regularly, as told by your health care provider. Ask your health care provider what types of exercise are safe for you and how often you should exercise.  · Warm up and stretch before being active.  · Cool down and stretch after being active.  · Rest between periods of activity.  Lifestyle  · Limit the time that you spend in front of your TV, computer, or video game system.  · Find ways to reward yourself that do not involve food.  · Limit alcohol intake to no more than 1 drink a day for nonpregnant women and 2 drinks a day for men. One drink equals 12 oz of beer, 5 oz of wine, or 1½ oz of hard liquor.  General instructions  · Keep a weight loss journal to keep track of the food you eat and how much you exercise you get.  · Take over-the-counter and prescription medicines only as told by your health care provider.  · Take vitamins and supplements only as told by your health care provider.  · Consider joining a support group. Your health care provider may be able to recommend a support group.  · Keep all follow-up visits as told by your health care provider. This is important.  Contact a health care provider if:  · You are unable to meet your weight loss goal after 6 weeks of dietary and lifestyle changes.  This information is not intended to replace advice given to you by your health care provider. Make sure you discuss any questions you have with your health care provider.  Document Released: 01/25/2006 Document Revised:  "05/22/2017 Document Reviewed: 10/05/2016  [employer] Interactive Patient Education © 2019 [employer].    Heart-Healthy Eating Plan  Heart-healthy meal planning includes:  · Limiting unhealthy fats.  · Increasing healthy fats.  · Making other small dietary changes.    You may need to talk with your doctor or a diet specialist (dietitian) to create an eating plan that is right for you.  What types of fat should I choose?  · Choose healthy fats. These include olive oil and canola oil, flaxseeds, walnuts, almonds, and seeds.  · Eat more omega-3 fats. These include salmon, mackerel, sardines, tuna, flaxseed oil, and ground flaxseeds. Try to eat fish at least twice each week.  · Limit saturated fats.  ? Saturated fats are often found in animal products, such as meats, butter, and cream.  ? Plant sources of saturated fats include palm oil, palm kernel oil, and coconut oil.  · Avoid foods with partially hydrogenated oils in them. These include stick margarine, some tub margarines, cookies, crackers, and other baked goods. These contain trans fats.  What general guidelines do I need to follow?  · Check food labels carefully. Identify foods with trans fats or high amounts of saturated fat.  · Fill one half of your plate with vegetables and green salads. Eat 4-5 servings of vegetables per day. A serving of vegetables is:  ? 1 cup of raw leafy vegetables.  ? ½ cup of raw or cooked cut-up vegetables.  ? ½ cup of vegetable juice.  · Fill one fourth of your plate with whole grains. Look for the word \"whole\" as the first word in the ingredient list.  · Fill one fourth of your plate with lean protein foods.  · Eat 4-5 servings of fruit per day. A serving of fruit is:  ? One medium whole fruit.  ? ¼ cup of dried fruit.  ? ½ cup of fresh, frozen, or canned fruit.  ? ½ cup of 100% fruit juice.  · Eat more foods that contain soluble fiber. These include apples, broccoli, carrots, beans, peas, and barley. Try to get 20-30 g of fiber per " day.  · Eat more home-cooked food. Eat less restaurant, buffet, and fast food.  · Limit or avoid alcohol.  · Limit foods high in starch and sugar.  · Avoid fried foods.  · Avoid frying your food. Try baking, boiling, grilling, or broiling it instead. You can also reduce fat by:  ? Removing the skin from poultry.  ? Removing all visible fats from meats.  ? Skimming the fat off of stews, soups, and gravies before serving them.  ? Steaming vegetables in water or broth.  · Lose weight if you are overweight.  · Eat 4-5 servings of nuts, legumes, and seeds per week:  ? One serving of dried beans or legumes equals ½ cup after being cooked.  ? One serving of nuts equals 1½ ounces.  ? One serving of seeds equals ½ ounce or one tablespoon.  · You may need to keep track of how much salt or sodium you eat. This is especially true if you have high blood pressure. Talk with your doctor or dietitian to get more information.  What foods can I eat?  Grains  Breads, including Thai, white, marshal, wheat, raisin, rye, oatmeal, and Italian. Tortillas that are neither fried nor made with lard or trans fat. Low-fat rolls, including hotdog and hamburger buns and English muffins. Biscuits. Muffins. Waffles. Pancakes. Light popcorn. Whole-grain cereals. Flatbread. [place] toast. Pretzels. Breadsticks. Rusks. Low-fat snacks. Low-fat crackers, including oyster, saltine, matzo, geraldo, animal, and rye. Rice and pasta, including brown rice and pastas that are made with whole wheat.  Vegetables  All vegetables.  Fruits  All fruits, but limit coconut.  Meats and Other Protein Sources  Lean, well-trimmed beef, veal, pork, and lamb. Chicken and turkey without skin. All fish and shellfish. Wild duck, rabbit, pheasant, and venison. Egg whites or low-cholesterol egg substitutes. Dried beans, peas, lentils, and tofu. Seeds and most nuts.  Dairy  Low-fat or nonfat cheeses, including ricotta, string, and mozzarella. Skim or 1% milk that is liquid, powdered,  or evaporated. Buttermilk that is made with low-fat milk. Nonfat or low-fat yogurt.  Beverages  Mineral water. Diet carbonated beverages.  Sweets and Desserts  Sherbets and fruit ices. Honey, jam, marmalade, jelly, and syrups. Meringues and gelatins. Pure sugar candy, such as hard candy, jelly beans, gumdrops, mints, marshmallows, and small amounts of dark chocolate. Jhon food cake.  Eat all sweets and desserts in moderation.  Fats and Oils  Nonhydrogenated (trans-free) margarines. Vegetable oils, including soybean, sesame, sunflower, olive, peanut, safflower, corn, canola, and cottonseed. Salad dressings or mayonnaise made with a vegetable oil. Limit added fats and oils that you use for cooking, baking, salads, and as spreads.  Other  Cocoa powder. Coffee and tea. All seasonings and condiments.  The items listed above may not be a complete list of recommended foods or beverages. Contact your dietitian for more options.  What foods are not recommended?  Grains  Breads that are made with saturated or trans fats, oils, or whole milk. Croissants. Butter rolls. Cheese breads. Sweet rolls. Donuts. Buttered popcorn. Chow mein noodles. High-fat crackers, such as cheese or butter crackers.  Meats and Other Protein Sources  Fatty meats, such as hotdogs, short ribs, sausage, spareribs, cortes, rib eye roast or steak, and mutton. High-fat deli meats, such as salami and bologna. Caviar. Domestic duck and goose. Organ meats, such as kidney, liver, sweetbreads, and heart.  Dairy  Cream, sour cream, cream cheese, and creamed cottage cheese. Whole-milk cheeses, including blue (anayeli), [place], Brie, Kareem, American, Havarti, Swiss, cheddar, Camembert, and [place]. Whole or 2% milk that is liquid, evaporated, or condensed. Whole buttermilk. Cream sauce or high-fat cheese sauce. Yogurt that is made from whole milk.  Beverages  Regular sodas and juice drinks with added sugar.  Sweets and Desserts  Frosting. Pudding. Cookies.  Cakes other than deandra food cake. Candy that has milk chocolate or white chocolate, hydrogenated fat, butter, coconut, or unknown ingredients. Buttered syrups. Full-fat ice cream or ice cream drinks.  Fats and Oils  Gravy that has suet, meat fat, or shortening. Cocoa butter, hydrogenated oils, palm oil, coconut oil, palm kernel oil. These can often be found in baked products, candy, fried foods, nondairy creamers, and whipped toppings. Solid fats and shortenings, including cortes fat, salt pork, lard, and butter. Nondairy cream substitutes, such as coffee creamers and sour cream substitutes. Salad dressings that are made of unknown oils, cheese, or sour cream.  The items listed above may not be a complete list of foods and beverages to avoid. Contact your dietitian for more information.  This information is not intended to replace advice given to you by your health care provider. Make sure you discuss any questions you have with your health care provider.  Document Released: 06/18/2013 Document Revised: 05/25/2017 Document Reviewed: 06/11/2015  [employer] Interactive Patient Education © 2019 [employer]    Exercising to Lose Weight  Exercising can help you to lose weight. In order to lose weight through exercise, you need to do vigorous-intensity exercise. You can tell that you are exercising with vigorous intensity if you are breathing very hard and fast and cannot hold a conversation while exercising.  Moderate-intensity exercise helps to maintain your current weight. You can tell that you are exercising at a moderate level if you have a higher heart rate and faster breathing, but you are still able to hold a conversation.  How often should I exercise?  Choose an activity that you enjoy and set realistic goals. Your health care provider can help you to make an activity plan that works for you. Exercise regularly as directed by your health care provider. This may include:  · Doing resistance training twice each  week, such as:  ? Push-ups.  ? Sit-ups.  ? Lifting weights.  ? Using resistance bands.  · Doing a given intensity of exercise for a given amount of time. Choose from these options:  ? 150 minutes of moderate-intensity exercise every week.  ? 75 minutes of vigorous-intensity exercise every week.  ? A mix of moderate-intensity and vigorous-intensity exercise every week.    Children, pregnant women, people who are out of shape, people who are overweight, and older adults may need to consult a health care provider for individual recommendations. If you have any sort of medical condition, be sure to consult your health care provider before starting a new exercise program.  What are some activities that can help me to lose weight?  · Walking at a rate of at least 4.5 miles an hour.  · Jogging or running at a rate of 5 miles per hour.  · Biking at a rate of at least 10 miles per hour.  · Lap swimming.  · Roller-skating or in-line skating.  · Cross-country skiing.  · Vigorous competitive sports, such as football, basketball, and soccer.  · Jumping rope.  · Aerobic dancing.  How can I be more active in my day-to-day activities?  · Use the stairs instead of the elevator.  · Take a walk during your lunch break.  · If you drive, park your car farther away from work or school.  · If you take public transportation, get off one stop early and walk the rest of the way.  · Make all of your phone calls while standing up and walking around.  · Get up, stretch, and walk around every 30 minutes throughout the day.  What guidelines should I follow while exercising?  · Do not exercise so much that you hurt yourself, feel dizzy, or get very short of breath.  · Consult your health care provider prior to starting a new exercise program.  · Wear comfortable clothes and shoes with good support.  · Drink plenty of water while you exercise to prevent dehydration or heat stroke. Body water is lost during exercise and must be replaced.  · Work out  until you breathe faster and your heart beats faster.  This information is not intended to replace advice given to you by your health care provider. Make sure you discuss any questions you have with your health care provider.  Document Released: 01/20/2012 Document Revised: 05/25/2017 Document Reviewed: 05/21/2015  [employer] Interactive Patient Education © 2019 [employer]

## 2019-04-24 NOTE — PROGRESS NOTES
Subjective:     Encounter Date:04/24/2019      Patient ID: Kacie Baron is a 59 y.o. female. She presents today for routine follow up. She has a history of congestive heart failure, hypertension, hyperlipidemia, type 2 diabetes mellitus, obstructive sleep apnea and obesity. She complains of progressively worsening fatigue.  She denies chest pain, shortness of breath, palpitations, dizziness, syncope, orthopnea, PND, swelling or decreased stamina.    Chief Complaint: routine follow up  Hypertension   This is a chronic problem. The current episode started more than 1 year ago. The problem is controlled. Associated symptoms include malaise/fatigue. Pertinent negatives include no anxiety, blurred vision, chest pain, headaches, neck pain, orthopnea, palpitations, peripheral edema, PND, shortness of breath or sweats. Risk factors for coronary artery disease include obesity, post-menopausal state, dyslipidemia and diabetes mellitus. Current antihypertension treatment includes beta blockers, angiotensin blockers and diuretics. The current treatment provides significant improvement. Hypertensive end-organ damage includes heart failure.   Hyperlipidemia   This is a chronic problem. The current episode started more than 1 year ago. The problem is controlled. Exacerbating diseases include diabetes and obesity. Pertinent negatives include no chest pain, myalgias or shortness of breath. Current antihyperlipidemic treatment includes fibric acid derivatives. Risk factors for coronary artery disease include obesity, hypertension, dyslipidemia, diabetes mellitus and post-menopausal.   Obesity   This is a chronic problem. The current episode started more than 1 year ago. Associated symptoms include fatigue. Pertinent negatives include no abdominal pain, anorexia, arthralgias, change in bowel habit, chest pain, chills, congestion, coughing, diaphoresis, fever, headaches, joint swelling, myalgias, nausea, neck pain, numbness, rash,  sore throat, swollen glands, urinary symptoms, vertigo, visual change, vomiting or weakness.       The following portions of the patient's history were reviewed and updated as appropriate: allergies, current medications, past family history, past medical history, past social history, past surgical history and problem list.     Allergies   Allergen Reactions   • Lisinopril Nausea Only       Current Outpatient Medications:   •  aspirin 81 MG EC tablet, Take 81 mg by mouth Daily., Disp: , Rfl:   •  carvedilol (COREG) 12.5 MG tablet, Take 12.5 mg by mouth 2 (Two) Times a Day With Meals., Disp: , Rfl:   •  Cholecalciferol (VITAMIN D3) 5000 UNITS tablet, Take  by mouth., Disp: , Rfl:   •  esomeprazole (nexIUM) 40 MG capsule, Take 40 mg by mouth Every Morning Before Breakfast., Disp: , Rfl:   •  fenofibrate (TRICOR) 145 MG tablet, Take 165 mg by mouth Daily., Disp: , Rfl:   •  FLUoxetine (PROzac) 40 MG capsule, Take 40 mg by mouth Daily., Disp: , Rfl:   •  furosemide (LASIX) 20 MG tablet, Take 20 mg by mouth As Needed., Disp: , Rfl:   •  losartan (COZAAR) 25 MG tablet, Take 25 mg by mouth Daily., Disp: , Rfl:   •  meclizine 25 MG chewable tablet chewable tablet, Chew 25 mg 3 (Three) Times a Day As Needed., Disp: , Rfl:   •  metFORMIN (GLUCOPHAGE) 1000 MG tablet, Take 1,000 mg by mouth 2 (Two) Times a Day With Meals., Disp: , Rfl:   •  Thyroid 90 MG PO tablet, Take 90 mg by mouth Daily., Disp: , Rfl:   •  traZODone (DESYREL) 75 MG half tablet, Take 120 mg by mouth Every Night., Disp: , Rfl:   Past Medical History:   Diagnosis Date   • Arthritis    • CHF (congestive heart failure) (CMS/HCC)    • Depression    • Diabetes mellitus (CMS/HCC)    • DM (diabetes mellitus screen)    • GERD (gastroesophageal reflux disease)    • HLD (hyperlipidemia)    • HTN (hypertension)    • Sleep apnea    • Thyroid condition      Past Surgical History:   Procedure Laterality Date   • BACK SURGERY      2007   • CARDIAC CATHETERIZATION     •  COLONOSCOPY      2016   • CYST REMOVAL     • DILATATION AND CURETTAGE     • TONSILLECTOMY AND ADENOIDECTOMY     • TUBAL ABDOMINAL LIGATION       Family History   Problem Relation Age of Onset   • Heart disease Mother    • Heart disease Father    • Heart attack Father    • Breast cancer Maternal Grandmother    • Breast cancer Paternal Grandmother      Social History     Socioeconomic History   • Marital status: Unknown     Spouse name: Not on file   • Number of children: Not on file   • Years of education: Not on file   • Highest education level: Not on file   Tobacco Use   • Smoking status: Never Smoker   • Smokeless tobacco: Never Used   Substance and Sexual Activity   • Alcohol use: No   • Drug use: Defer   • Sexual activity: Yes     Partners: Male         Review of Systems   Constitution: Positive for fatigue and malaise/fatigue. Negative for chills, decreased appetite, diaphoresis, fever, weakness, night sweats, weight gain and weight loss.   HENT: Negative for congestion, nosebleeds and sore throat.    Eyes: Negative for blurred vision and visual disturbance.   Cardiovascular: Negative for chest pain, dyspnea on exertion, leg swelling, near-syncope, orthopnea, palpitations, paroxysmal nocturnal dyspnea and syncope.   Respiratory: Negative for cough, hemoptysis, shortness of breath, snoring and wheezing.    Endocrine: Negative for cold intolerance and heat intolerance.   Hematologic/Lymphatic: Does not bruise/bleed easily.   Skin: Negative for rash.   Musculoskeletal: Negative for arthralgias, back pain, falls, joint swelling, myalgias and neck pain.   Gastrointestinal: Negative for abdominal pain, anorexia, change in bowel habit, constipation, diarrhea, dysphagia, heartburn, nausea and vomiting.   Genitourinary: Negative for hematuria.   Neurological: Negative for dizziness, headaches, light-headedness, numbness and vertigo.   Psychiatric/Behavioral: Negative for altered mental status.   Allergic/Immunologic:  "Negative for persistent infections.         ECG 12 Lead  Date/Time: 4/24/2019 9:04 AM  Performed by: Shilpi Ware APRN  Authorized by: Shilpi Ware APRN   Comparison: compared with previous ECG from 10/26/2018  Similar to previous ECG  Rhythm: sinus rhythm  Rate: normal  BPM: 67    Clinical impression: normal ECG          /90   Pulse 67   Ht 172.7 cm (68\")   Wt (!) 139 kg (307 lb)   BMI 46.68 kg/m²        Objective:     Physical Exam   Constitutional: She is oriented to person, place, and time. Vital signs are normal. She appears well-developed and well-nourished. No distress.   HENT:   Head: Normocephalic and atraumatic.   Right Ear: External ear normal.   Left Ear: External ear normal.   Nose: Nose normal.   Eyes: Conjunctivae are normal. Pupils are equal, round, and reactive to light. Right eye exhibits no discharge. Left eye exhibits no discharge.   Neck: Normal range of motion. Neck supple. No JVD present. Carotid bruit is not present. No tracheal deviation present. No thyromegaly present.   Cardiovascular: Normal rate, regular rhythm, normal heart sounds, intact distal pulses and normal pulses. PMI is not displaced. Exam reveals no gallop and no friction rub.   No murmur heard.  Pulses:       Radial pulses are 2+ on the right side, and 2+ on the left side.        Dorsalis pedis pulses are 2+ on the right side, and 2+ on the left side.        Posterior tibial pulses are 2+ on the right side, and 2+ on the left side.   Pulmonary/Chest: Effort normal and breath sounds normal. No respiratory distress. She has no decreased breath sounds. She has no wheezes. She has no rhonchi. She has no rales. She exhibits no tenderness.   Abdominal: Soft. She exhibits no distension. There is no tenderness.   Musculoskeletal: Normal range of motion. She exhibits no edema, tenderness or deformity.   Neurological: She is alert and oriented to person, place, and time.   Skin: Skin is warm and dry. No rash noted. She " is not diaphoretic. No erythema. No pallor.   Psychiatric: She has a normal mood and affect. Her behavior is normal. Judgment and thought content normal.   Vitals reviewed.        Assessment:          Diagnosis Plan   1. Essential hypertension  Well controlled.    2. Mixed hyperlipidemia  Managed by PCP. On fenofibrate.    3. History of congestive heart failure in adulthood  EF 49% with diastolic dysfunction on echo in 2013. Repeat echo in 2014 revealed EF of 60% and no evidence of diastolic dysfunction. No signs or symptoms of CHF.    4. Type 2 diabetes mellitus with complication, without long-term current use of insulin (CMS/[facility])  Managed by PCP.    5. Other sleep apnea  Compliant with CPAP.    6. Class 3 severe obesity due to excess calories with serious comorbidity and body mass index (BMI) of 45.0 to 49.9 in adult (CMS/[facility])  Patient's Body mass index is 46.68 kg/m². BMI is above normal parameters. Recommendations include: educational material.            Plan:       1. Continue medications as previously prescribed.  2. Report any worsening symptoms.  3. Report any signs of bleeding.  4. Continue heart healthy diet and regular exercise as tolerated.   5. Follow up with PCP for blood pressure and cholesterol management and routine lab work. Consider fatigue workup. If normal, could consider stress test in future.   6. Follow up with CHF clinic in 6 months, or sooner if needed.

## 2019-05-28 ENCOUNTER — HOSPITAL ENCOUNTER (OUTPATIENT)
Dept: GENERAL RADIOLOGY | Facility: HOSPITAL | Age: 59
Discharge: HOME | End: 2019-05-28
Admitting: NURSE PRACTITIONER

## 2019-05-28 ENCOUNTER — TRANSCRIBE ORDERS (OUTPATIENT)
Dept: GENERAL RADIOLOGY | Facility: HOSPITAL | Age: 59
End: 2019-05-28

## 2019-05-28 DIAGNOSIS — M25.551 RIGHT HIP PAIN: ICD-10-CM

## 2019-05-28 DIAGNOSIS — M25.551 RIGHT HIP PAIN: Primary | ICD-10-CM

## 2019-05-28 PROCEDURE — 73521 X-RAY EXAM HIPS BI 2 VIEWS: CPT

## 2019-07-16 ENCOUNTER — HOSPITAL ENCOUNTER (OUTPATIENT)
Dept: MRI IMAGING | Age: 59
Discharge: HOME | End: 2019-07-16
Payer: COMMERCIAL

## 2019-07-16 DIAGNOSIS — M25.551 RIGHT HIP PAIN: ICD-10-CM

## 2019-07-16 PROCEDURE — 73721 MRI JNT OF LWR EXTRE W/O DYE: CPT

## 2019-07-24 ENCOUNTER — TRANSCRIBE ORDERS (OUTPATIENT)
Dept: ADMINISTRATIVE | Facility: HOSPITAL | Age: 59
End: 2019-07-24

## 2019-07-24 DIAGNOSIS — Z12.31 ENCOUNTER FOR SCREENING MAMMOGRAM FOR BREAST CANCER: Primary | ICD-10-CM

## 2019-08-08 ENCOUNTER — HOSPITAL ENCOUNTER (OUTPATIENT)
Dept: MAMMOGRAPHY | Facility: HOSPITAL | Age: 59
Discharge: HOME | End: 2019-08-08
Admitting: NURSE PRACTITIONER

## 2019-08-08 DIAGNOSIS — Z12.31 ENCOUNTER FOR SCREENING MAMMOGRAM FOR BREAST CANCER: ICD-10-CM

## 2019-08-08 PROCEDURE — 77067 SCR MAMMO BI INCL CAD: CPT

## 2019-08-08 PROCEDURE — 77063 BREAST TOMOSYNTHESIS BI: CPT

## 2019-09-09 ENCOUNTER — TRANSCRIBE ORDERS (OUTPATIENT)
Dept: GENERAL RADIOLOGY | Facility: HOSPITAL | Age: 59
End: 2019-09-09

## 2019-09-09 ENCOUNTER — HOSPITAL ENCOUNTER (OUTPATIENT)
Dept: GENERAL RADIOLOGY | Facility: HOSPITAL | Age: 59
Discharge: HOME | End: 2019-09-09
Admitting: NURSE PRACTITIONER

## 2019-09-09 DIAGNOSIS — Z13.820 SCREENING FOR OSTEOPOROSIS: ICD-10-CM

## 2019-09-09 DIAGNOSIS — Z78.0 POST-MENOPAUSAL: ICD-10-CM

## 2019-09-09 DIAGNOSIS — Z78.0 POST-MENOPAUSAL: Primary | ICD-10-CM

## 2019-09-09 PROCEDURE — 77080 DXA BONE DENSITY AXIAL: CPT

## 2019-12-17 ENCOUNTER — OFFICE VISIT (OUTPATIENT)
Dept: CARDIOLOGY | Facility: CLINIC | Age: 59
End: 2019-12-17

## 2019-12-17 VITALS
HEIGHT: 68 IN | WEIGHT: 293 LBS | SYSTOLIC BLOOD PRESSURE: 140 MMHG | DIASTOLIC BLOOD PRESSURE: 85 MMHG | BODY MASS INDEX: 44.41 KG/M2 | RESPIRATION RATE: 18 BRPM | HEART RATE: 77 BPM | OXYGEN SATURATION: 98 %

## 2019-12-17 DIAGNOSIS — E03.9 HYPOTHYROIDISM, UNSPECIFIED TYPE: ICD-10-CM

## 2019-12-17 DIAGNOSIS — G47.39 OTHER SLEEP APNEA: ICD-10-CM

## 2019-12-17 DIAGNOSIS — E11.9 TYPE 2 DIABETES MELLITUS WITHOUT COMPLICATION, WITHOUT LONG-TERM CURRENT USE OF INSULIN (HCC): ICD-10-CM

## 2019-12-17 DIAGNOSIS — Z86.79 HISTORY OF CONGESTIVE HEART FAILURE IN ADULTHOOD: ICD-10-CM

## 2019-12-17 DIAGNOSIS — E78.2 MIXED HYPERLIPIDEMIA: ICD-10-CM

## 2019-12-17 DIAGNOSIS — I10 ESSENTIAL HYPERTENSION: Primary | ICD-10-CM

## 2019-12-17 DIAGNOSIS — E66.01 CLASS 3 SEVERE OBESITY DUE TO EXCESS CALORIES WITH SERIOUS COMORBIDITY AND BODY MASS INDEX (BMI) OF 45.0 TO 49.9 IN ADULT (HCC): ICD-10-CM

## 2019-12-17 PROCEDURE — 99214 OFFICE O/P EST MOD 30 MIN: CPT | Performed by: NURSE PRACTITIONER

## 2019-12-17 PROCEDURE — 93000 ELECTROCARDIOGRAM COMPLETE: CPT | Performed by: NURSE PRACTITIONER

## 2019-12-17 RX ORDER — GLIMEPIRIDE 4 MG/1
4 TABLET ORAL
COMMUNITY
End: 2020-06-18

## 2019-12-17 RX ORDER — LOSARTAN POTASSIUM 50 MG/1
50 TABLET ORAL DAILY
Qty: 30 TABLET | Refills: 11 | Status: SHIPPED | OUTPATIENT
Start: 2019-12-17 | End: 2020-06-18

## 2019-12-17 RX ORDER — OMEPRAZOLE 20 MG/1
20 CAPSULE, DELAYED RELEASE ORAL DAILY
COMMUNITY
End: 2020-06-18 | Stop reason: ALTCHOICE

## 2019-12-17 NOTE — PROGRESS NOTES
Subjective:     Encounter Date:12/17/2019      Patient ID: Kacie Baron is a 59 y.o. female. She presents today for routine follow up. She has a history of congestive heart failure, hypertension, hyperlipidemia, type 2 diabetes mellitus, obstructive sleep apnea, hypothyroidism and obesity. She continues to complain of fatigue.  She denies chest pain, shortness of breath, palpitations, dizziness, syncope, orthopnea, PND, swelling or decreased stamina.  She reports blood pressure has been elevated over the last month.  Cholesterol is managed by her PCP and reported to be well controlled.    Chief Complaint: routine follow up  Hypertension   This is a chronic problem. The current episode started more than 1 year ago. The problem is controlled. Associated symptoms include malaise/fatigue. Pertinent negatives include no anxiety, blurred vision, chest pain, headaches, neck pain, orthopnea, palpitations, peripheral edema, PND, shortness of breath or sweats. Risk factors for coronary artery disease include obesity, post-menopausal state, dyslipidemia and diabetes mellitus. Current antihypertension treatment includes beta blockers, angiotensin blockers and diuretics. The current treatment provides significant improvement.   Hyperlipidemia   This is a chronic problem. The current episode started more than 1 year ago. The problem is controlled. Exacerbating diseases include diabetes. Pertinent negatives include no chest pain or shortness of breath. Current antihyperlipidemic treatment includes fibric acid derivatives. Risk factors for coronary artery disease include obesity, hypertension, dyslipidemia, diabetes mellitus and post-menopausal.   Congestive Heart Failure   Presents for follow-up visit. Pertinent negatives include no abdominal pain, chest pain, chest pressure, claudication, edema, fatigue, muscle weakness, near-syncope, nocturia, orthopnea, palpitations, paroxysmal nocturnal dyspnea, shortness of breath or  unexpected weight change. The symptoms have been stable. Compliance with total regimen is 51-75%. Compliance with diet is 51-75%. Compliance with exercise is 51-75%. Compliance with medications is %.       The following portions of the patient's history were reviewed and updated as appropriate: allergies, current medications, past family history, past medical history, past social history, past surgical history and problem list.     No Known Allergies    Current Outpatient Medications:   •  aspirin 81 MG EC tablet, Take 81 mg by mouth Daily., Disp: , Rfl:   •  carvedilol (COREG) 12.5 MG tablet, Take 12.5 mg by mouth 2 (Two) Times a Day With Meals., Disp: , Rfl:   •  Cholecalciferol (VITAMIN D3) 5000 UNITS tablet, Take  by mouth., Disp: , Rfl:   •  DICLOFENAC PO, Take 75 mg by mouth., Disp: , Rfl:   •  fenofibrate 160 MG tablet, Take 160 mg by mouth Daily., Disp: , Rfl:   •  FLUoxetine (PROzac) 40 MG capsule, Take 40 mg by mouth Daily., Disp: , Rfl:   •  furosemide (LASIX) 20 MG tablet, Take 20 mg by mouth As Needed., Disp: , Rfl:   •  glimepiride (AMARYL) 4 MG tablet, Take 4 mg by mouth Every Morning Before Breakfast., Disp: , Rfl:   •  losartan (COZAAR) 50 MG tablet, Take 1 tablet by mouth Daily., Disp: 30 tablet, Rfl: 11  •  meclizine 25 MG chewable tablet chewable tablet, Chew 25 mg 3 (Three) Times a Day As Needed., Disp: , Rfl:   •  metFORMIN (GLUCOPHAGE) 1000 MG tablet, Take 1,000 mg by mouth 2 (Two) Times a Day With Meals., Disp: , Rfl:   •  omeprazole (priLOSEC) 20 MG capsule, Take 20 mg by mouth Daily., Disp: , Rfl:   •  Thyroid 120 MG PO tablet, Take 120 mg by mouth Daily., Disp: , Rfl:   •  traZODone (DESYREL) 75 MG half tablet, Take 100 mg by mouth Every Night., Disp: , Rfl:   Past Medical History:   Diagnosis Date   • Arthritis    • CHF (congestive heart failure) (CMS/HCC)    • Depression    • Diabetes mellitus (CMS/HCC)    • DM (diabetes mellitus screen)    • GERD (gastroesophageal reflux disease)     • HLD (hyperlipidemia)    • HTN (hypertension)    • Sleep apnea    • Thyroid condition      Past Surgical History:   Procedure Laterality Date   • BACK SURGERY      2007   • CARDIAC CATHETERIZATION     • COLONOSCOPY      2016   • CYST REMOVAL     • DILATATION AND CURETTAGE     • TONSILLECTOMY AND ADENOIDECTOMY     • TUBAL ABDOMINAL LIGATION       Family History   Problem Relation Age of Onset   • Heart disease Mother    • Heart disease Father    • Heart attack Father    • Breast cancer Maternal Grandmother    • Breast cancer Paternal Grandmother      Social History     Socioeconomic History   • Marital status: Unknown     Spouse name: Not on file   • Number of children: Not on file   • Years of education: Not on file   • Highest education level: Not on file   Tobacco Use   • Smoking status: Never Smoker   • Smokeless tobacco: Never Used   Substance and Sexual Activity   • Alcohol use: No   • Drug use: Defer   • Sexual activity: Yes     Partners: Male         Review of Systems   Constitution: Positive for malaise/fatigue. Negative for decreased appetite, fatigue, night sweats, unexpected weight change, weight gain and weight loss.   HENT: Negative for nosebleeds.    Eyes: Negative for blurred vision and visual disturbance.   Cardiovascular: Negative for chest pain, claudication, dyspnea on exertion, near-syncope, orthopnea, palpitations and paroxysmal nocturnal dyspnea.   Respiratory: Negative for shortness of breath and snoring.    Endocrine: Negative for cold intolerance and heat intolerance.   Hematologic/Lymphatic: Does not bruise/bleed easily.   Musculoskeletal: Negative for back pain, falls, muscle weakness and neck pain.   Gastrointestinal: Negative for abdominal pain, constipation, diarrhea, dysphagia and heartburn.   Genitourinary: Negative for hematuria and nocturia.   Neurological: Negative for headaches and light-headedness.   Psychiatric/Behavioral: Negative for altered mental status.  "  Allergic/Immunologic: Negative for persistent infections.         ECG 12 Lead  Date/Time: 12/17/2019 9:58 AM  Performed by: Shilpi Perez APRN  Authorized by: Shilpi Perez APRN   Comparison: compared with previous ECG from 4/24/2019  Rhythm: sinus rhythm  Rate: normal  BPM: 77  T inversion: V2 and V3    Clinical impression: abnormal EKG          /85 (BP Location: Right arm, Patient Position: Sitting, Cuff Size: Adult)   Pulse 77   Resp 18   Ht 172.7 cm (68\")   Wt (!) 141 kg (310 lb)   SpO2 98%   Breastfeeding No   BMI 47.14 kg/m²        Objective:     Physical Exam   Constitutional: She is oriented to person, place, and time. Vital signs are normal. She appears well-developed and well-nourished. No distress.   HENT:   Head: Normocephalic and atraumatic.   Right Ear: External ear normal.   Left Ear: External ear normal.   Nose: Nose normal.   Eyes: Pupils are equal, round, and reactive to light. Conjunctivae are normal. Right eye exhibits no discharge. Left eye exhibits no discharge.   Neck: Normal range of motion. Neck supple. No JVD present. Carotid bruit is not present. No tracheal deviation present. No thyromegaly present.   Cardiovascular: Normal rate, regular rhythm, normal heart sounds, intact distal pulses and normal pulses. PMI is not displaced. Exam reveals no gallop and no friction rub.   No murmur heard.  Pulses:       Radial pulses are 2+ on the right side, and 2+ on the left side.        Dorsalis pedis pulses are 2+ on the right side, and 2+ on the left side.        Posterior tibial pulses are 2+ on the right side, and 2+ on the left side.   Pulmonary/Chest: Effort normal and breath sounds normal. No respiratory distress. She has no decreased breath sounds. She has no wheezes. She has no rhonchi. She has no rales. She exhibits no tenderness.   Abdominal: Soft. She exhibits no distension. There is no tenderness.   Musculoskeletal: Normal range of motion. She exhibits no edema, " tenderness or deformity.   Neurological: She is alert and oriented to person, place, and time.   Skin: Skin is warm and dry. No rash noted. She is not diaphoretic. No erythema. No pallor.   Psychiatric: She has a normal mood and affect. Her behavior is normal. Judgment and thought content normal.   Vitals reviewed.        Assessment:          Diagnosis Plan   1. Essential hypertension  Elevated. Increase losartan to 50 mg daily.  Monitor and record daily blood pressure. Report readings consistently higher than 140/90 or consistently lower than 100/60.    2. Mixed hyperlipidemia  Managed by PCP. On fenofibrate.    3. History of congestive heart failure in adulthood  EF 49% with diastolic dysfunction on echo in 2013. Repeat echo in 2014 revealed EF of 60% and no evidence of diastolic dysfunction. No signs or symptoms of CHF.    4. Type 2 diabetes mellitus with complication, without long-term current use of insulin (CMS/HCC)  Managed by PCP.    5. Other sleep apnea  Compliant with CPAP.    6. Class 3 severe obesity due to excess calories with serious comorbidity and body mass index (BMI) of 45.0 to 49.9 in adult (CMS/[facility])  Patient's Body mass index is 47.14 kg/m². BMI is above normal parameters. Recommendations include: educational material.     7. Hypothyroidism, unspecified type Pt is being referred to [place] for workup of possible Hashimoto's thyroiditis.           Plan:       1. Increase losartan to 50 mg daily. Continue other medications as previously prescribed.  2. Report any worsening symptoms.  3. Report any signs of bleeding.  4. Continue heart healthy diet and regular exercise as tolerated.   5. Follow up with PCP for blood pressure and cholesterol management and routine lab work. Consider fatigue workup. If normal, could consider stress test in future.   6. Follow up with CHF clinic in 6 months, or sooner if needed.

## 2020-06-15 ENCOUNTER — TELEPHONE (OUTPATIENT)
Dept: CARDIOLOGY | Facility: CLINIC | Age: 60
End: 2020-06-15

## 2020-06-15 NOTE — TELEPHONE ENCOUNTER
----- Message from Guanaco Baugh MD sent at 6/12/2020  5:31 PM CDT -----  Patient has upcoming cardiology appointment on 6/18/2020.  Cardiac risk stratification for surgery can be performed at that time.      ----- Message -----  From: Naomi Monae MA  Sent: 6/12/2020   3:36 PM CDT  To: Guanaco Baugh MD    CARDIAC CLEARANCE REQ

## 2020-06-18 ENCOUNTER — OFFICE VISIT (OUTPATIENT)
Dept: CARDIOLOGY | Facility: CLINIC | Age: 60
End: 2020-06-18

## 2020-06-18 VITALS
SYSTOLIC BLOOD PRESSURE: 110 MMHG | HEART RATE: 74 BPM | OXYGEN SATURATION: 98 % | WEIGHT: 246 LBS | DIASTOLIC BLOOD PRESSURE: 70 MMHG | BODY MASS INDEX: 37.28 KG/M2 | HEIGHT: 68 IN

## 2020-06-18 DIAGNOSIS — E78.2 MIXED HYPERLIPIDEMIA: ICD-10-CM

## 2020-06-18 DIAGNOSIS — G47.39 OTHER SLEEP APNEA: ICD-10-CM

## 2020-06-18 DIAGNOSIS — E03.9 HYPOTHYROIDISM, UNSPECIFIED TYPE: ICD-10-CM

## 2020-06-18 DIAGNOSIS — E11.9 TYPE 2 DIABETES MELLITUS WITHOUT COMPLICATION, WITHOUT LONG-TERM CURRENT USE OF INSULIN (HCC): ICD-10-CM

## 2020-06-18 DIAGNOSIS — E66.01 CLASS 3 SEVERE OBESITY DUE TO EXCESS CALORIES WITH SERIOUS COMORBIDITY AND BODY MASS INDEX (BMI) OF 45.0 TO 49.9 IN ADULT (HCC): ICD-10-CM

## 2020-06-18 DIAGNOSIS — Z86.79 HISTORY OF CONGESTIVE HEART FAILURE IN ADULTHOOD: Primary | ICD-10-CM

## 2020-06-18 DIAGNOSIS — I10 ESSENTIAL HYPERTENSION: ICD-10-CM

## 2020-06-18 PROCEDURE — 93000 ELECTROCARDIOGRAM COMPLETE: CPT | Performed by: NURSE PRACTITIONER

## 2020-06-18 PROCEDURE — 99214 OFFICE O/P EST MOD 30 MIN: CPT | Performed by: NURSE PRACTITIONER

## 2020-06-18 RX ORDER — CELECOXIB 100 MG/1
100 CAPSULE ORAL DAILY
COMMUNITY
End: 2021-08-04

## 2020-06-18 RX ORDER — ESOMEPRAZOLE MAGNESIUM 40 MG/1
40 CAPSULE, DELAYED RELEASE ORAL
COMMUNITY
End: 2021-08-04

## 2020-06-18 RX ORDER — CARVEDILOL 6.25 MG/1
6.25 TABLET ORAL 2 TIMES DAILY WITH MEALS
Qty: 60 TABLET | Refills: 11 | Status: SHIPPED | OUTPATIENT
Start: 2020-06-18

## 2020-06-18 RX ORDER — CARVEDILOL 12.5 MG/1
6.25 TABLET ORAL 2 TIMES DAILY WITH MEALS
Qty: 60 TABLET | Refills: 11 | Status: SHIPPED | OUTPATIENT
Start: 2020-06-18 | End: 2020-06-18 | Stop reason: SDUPTHER

## 2020-06-18 NOTE — PATIENT INSTRUCTIONS
Heart Failure, Diagnosis    Heart failure means that your heart is not able to pump blood in the right way. This makes it hard for your body to work well. Heart failure is usually a long-term (chronic) condition. You must take good care of yourself and follow your treatment plan from your doctor.  What are the causes?  This condition may be caused by:  · High blood pressure.  · Build up of cholesterol and fat in the arteries.  · Heart attack. This injures the heart muscle.  · Heart valves that do not open and close properly.  · Damage of the heart muscle. This is also called cardiomyopathy.  · Lung disease.  · Abnormal heart rhythms.  What increases the risk?  The risk of heart failure goes up as a person ages. This condition is also more likely to develop in people who:  · Are overweight.  · Are male.  · Smoke or chew tobacco.  · Abuse alcohol or illegal drugs.  · Have taken medicines that can damage the heart.  · Have diabetes.  · Have abnormal heart rhythms.  · Have thyroid problems.  · Have low blood counts (anemia).  What are the signs or symptoms?  Symptoms of this condition include:  · Shortness of breath.  · Coughing.  · Swelling of the feet, ankles, legs, or belly.  · Losing weight for no reason.  · Trouble breathing.  · Waking from sleep because of the need to sit up and get more air.  · Rapid heartbeat.  · Being very tired.  · Feeling dizzy, or feeling like you may pass out (faint).  · Having no desire to eat.  · Feeling like you may vomit (nauseous).  · Peeing (urinating) more at night.  · Feeling confused.  How is this treated?         This condition may be treated with:  · Medicines. These can be given to treat blood pressure and to make the heart muscles stronger.  · Changes in your daily life. These may include eating a healthy diet, staying at a healthy body weight, quitting tobacco and illegal drug use, or doing exercises.  · Surgery. Surgery can be done to open blocked valves, or to put devices in  the heart, such as pacemakers.  · A donor heart (heart transplant). You will receive a healthy heart from a donor.  Follow these instructions at home:  · Treat other conditions as told by your doctor. These may include high blood pressure, diabetes, thyroid disease, or abnormal heart rhythms.  · Learn as much as you can about heart failure.  · Get support as you need it.  · Keep all follow-up visits as told by your doctor. This is important.  Summary  · Heart failure means that your heart is not able to pump blood in the right way.  · This condition is caused by high blood pressure, heart attack, or damage of the heart muscle.  · Symptoms of this condition include shortness of breath and swelling of the feet, ankles, legs, or belly. You may also feel very tired or feel like you may vomit.  · You may be treated with medicines, surgery, or changes in your daily life.  · Treat other health conditions as told by your doctor.  This information is not intended to replace advice given to you by your health care provider. Make sure you discuss any questions you have with your health care provider.  Document Released: 09/26/2009 Document Revised: 03/06/2020 Document Reviewed: 03/06/2020  [employer]vier Patient Education © 2020 [employer]

## 2020-06-18 NOTE — PROGRESS NOTES
Subjective:     Encounter Date: 06/18/2020      Patient ID: Kacie Baron is a 60 y.o. female with a history of congestive heart failure, hypertension, hyperlipidemia, type 2 diabetes, obstructive sleep apnea, hypothyroidism and obesity.     Chief Complaint: 6 month follow up  Congestive Heart Failure   Presents for follow-up visit. Pertinent negatives include no abdominal pain, chest pain, chest pressure, claudication, edema, fatigue, near-syncope, orthopnea, palpitations, paroxysmal nocturnal dyspnea, shortness of breath or unexpected weight change. The symptoms have been stable. Compliance with total regimen is %. Compliance with diet is 51-75%. Compliance with exercise is 51-75%. Compliance with medications is %.   Hypertension   This is a chronic problem. The current episode started more than 1 year ago. The problem is controlled. Pertinent negatives include no chest pain, headaches, malaise/fatigue, orthopnea, palpitations, peripheral edema, PND or shortness of breath. Risk factors for coronary artery disease include diabetes mellitus, dyslipidemia and obesity. Current antihypertension treatment includes beta blockers and angiotensin blockers. There are no compliance problems.  Hypertensive end-organ damage includes heart failure. Identifiable causes of hypertension include a thyroid problem.   Hyperlipidemia   This is a chronic problem. The current episode started more than 1 year ago. Pertinent negatives include no chest pain or shortness of breath.     Patient presents today for 6 month follow up. At last office visit patients blood pressure was noted to be elevated so her Losartan was increased to 50 mg daily. Since visit patient reports that she has lost quite a bit of weight and has had hypotension on and off. She monitors her blood pressure daily and has noted over the past several weeks it has been running 100-109/50-60. She reports episodes of lightheadedness and dizziness as well as  feeling tired. She denies any near syncope, palpitations or heart racing. Patients PCP discontinued losartan and decreased coreg to 12.5 mg daily. She denies any chest pain. Patient denies any shortness of breath, orthopnea or PND. She reports some mild leg swelling if she is traveling and riding in the car a long time and then takes a prn lasix with full resolution. She reports last A1C was 5.1 so some diabetic medications were discontinued. Patient is scheduled to have right total knee replacement done in September and is needing cardiac clearance. She follows. Patient follows with Gabi HERNADEZ as PCP.     The following portions of the patient's history were reviewed and updated as appropriate: allergies, current medications, past family history, past medical history, past social history, past surgical history and problem list.    No Known Allergies    Current Outpatient Medications:   •  aspirin 81 MG EC tablet, Take 81 mg by mouth Daily., Disp: , Rfl:   •  carvedilol (COREG) 6.25 MG tablet, Take 1 tablet by mouth 2 (Two) Times a Day With Meals., Disp: 60 tablet, Rfl: 11  •  celecoxib (CeleBREX) 100 MG capsule, Take 100 mg by mouth Daily., Disp: , Rfl:   •  Cholecalciferol (VITAMIN D3) 5000 UNITS tablet, Take  by mouth., Disp: , Rfl:   •  esomeprazole (nexIUM) 40 MG capsule, Take 40 mg by mouth Every Morning Before Breakfast., Disp: , Rfl:   •  fenofibrate 160 MG tablet, Take 160 mg by mouth Daily., Disp: , Rfl:   •  FLUoxetine (PROzac) 40 MG capsule, Take 40 mg by mouth Daily., Disp: , Rfl:   •  furosemide (LASIX) 20 MG tablet, Take 20 mg by mouth As Needed., Disp: , Rfl:   •  Semaglutide, 1 MG/DOSE, (OZEMPIC) 2 MG/1.5ML solution pen-injector, Inject  under the skin into the appropriate area as directed 1 (One) Time Per Week., Disp: , Rfl:   •  Thyroid 120 MG PO tablet, Take 120 mg by mouth Daily., Disp: , Rfl:   •  traZODone (DESYREL) 75 MG half tablet, Take 100 mg by mouth Every Night., Disp: , Rfl:   •   meclizine 25 MG chewable tablet chewable tablet, Chew 25 mg 3 (Three) Times a Day As Needed., Disp: , Rfl:   Past Medical History:   Diagnosis Date   • Arthritis    • CHF (congestive heart failure) (CMS/HCC)    • Depression    • Diabetes mellitus (CMS/HCC)    • DM (diabetes mellitus screen)    • GERD (gastroesophageal reflux disease)    • HLD (hyperlipidemia)    • HTN (hypertension)    • Sleep apnea    • Thyroid condition      Social History     Socioeconomic History   • Marital status: Unknown     Spouse name: Not on file   • Number of children: Not on file   • Years of education: Not on file   • Highest education level: Not on file   Tobacco Use   • Smoking status: Never Smoker   • Smokeless tobacco: Never Used   Substance and Sexual Activity   • Alcohol use: No   • Drug use: Defer   • Sexual activity: Yes     Partners: Male       Review of Systems   Constitution: Negative for decreased appetite, diaphoresis, fatigue, malaise/fatigue, night sweats and unexpected weight change.   HENT: Negative for congestion and nosebleeds.    Eyes: Negative for visual disturbance.   Cardiovascular: Negative for chest pain, claudication, dyspnea on exertion, irregular heartbeat, leg swelling, near-syncope, orthopnea, palpitations, paroxysmal nocturnal dyspnea and syncope.   Respiratory: Negative for cough, shortness of breath and wheezing.    Hematologic/Lymphatic: Does not bruise/bleed easily.   Musculoskeletal: Positive for joint pain (right knee pain). Negative for arthritis.   Gastrointestinal: Negative for abdominal pain and change in bowel habit.   Genitourinary: Negative for hematuria and urgency.   Neurological: Negative for dizziness, headaches, light-headedness, loss of balance and weakness.   Psychiatric/Behavioral: Negative for altered mental status. The patient is not nervous/anxious.          ECG 12 Lead  Date/Time: 6/18/2020 10:20 AM  Performed by: Behzad Phipps APRN  Authorized by: Behzad Phipps APRN  "  Comparison: compared with previous ECG from 12/17/2019  Similar to previous ECG  Rhythm: sinus rhythm  Rate: normal  BPM: 74                 Objective:     Physical Exam   Constitutional: She is oriented to person, place, and time. She appears well-developed and well-nourished. No distress.   HENT:   Head: Normocephalic and atraumatic.   Nose: Nose normal.   Eyes: Pupils are equal, round, and reactive to light.   Neck: Normal range of motion. Neck supple. Carotid bruit is not present.   Cardiovascular: Normal rate, regular rhythm and normal heart sounds.   No murmur heard.  Pulmonary/Chest: Effort normal and breath sounds normal. No respiratory distress. She has no wheezes. She has no rales.   Abdominal: Soft. Normal appearance. She exhibits no distension.   Musculoskeletal: Normal range of motion. She exhibits no edema.   Brace on right knee   Neurological: She is alert and oriented to person, place, and time.   Skin: Skin is warm. No rash noted. No erythema.   Psychiatric: She has a normal mood and affect. Her speech is normal and behavior is normal. Judgment and thought content normal.   Vitals reviewed.      /70   Pulse 74   Ht 172.7 cm (68\")   Wt 112 kg (246 lb)   SpO2 98%   BMI 37.40 kg/m²     Lab Review:       5/26/2020:             I have personally reviewed labs, past office nt  Assessment:          Diagnosis Plan   1. History of congestive heart failure in adulthood     2. Essential hypertension     3. Mixed hyperlipidemia     4. Type 2 diabetes mellitus without complication, without long-term current use of insulin (CMS/[facility])     5. Hypothyroidism, unspecified type     6. Other sleep apnea     7. Class 3 severe obesity due to excess calories with serious comorbidity and body mass index (BMI) of 45.0 to 49.9 in adult (CMS/[facility])            Plan:       1. CHF: Compensated. Patient is on BB and uses Lasix as needed. Reviewed signs and symptoms of CHF and what to report to office with patient. " "Patient instructed to restrict sodium and record daily weight. Patient is to report weight gain of greater than 2 lbs overnight or 5 lbs in 1 week. Patient verbalized understanding of instructions and plan of care.     2. HTN: More recent patient has been having some hypotension. Losartan has been discontinued. Decreasing Coreg 6.25 BID She is to continue to monitor blood pressure.      3. HLD:On Fenofibrate. Managed by PCP.     4. Type 2 DM: Controlled with diet. Managed by PCP. A1C 5.3    5. Hypothyroidism: Followed by [facility]     6. Sleep apnea: Currently wearing CPAP at night    7. Obesity: Patient's Body mass index is 37.4 kg/m². BMI is above normal parameters. Recommendations include: exercise counseling and nutrition counseling.    Risk assessment- from a revised cardiac risk index standpoint, patient is low risk for a major cardiac event with this surgery. This is primarily because she has no known history of coronary/ischemic heart disease, cerebrovascular disease, insulin-dependent diabetes mellitus, or creatinine greater than 2. Though the risk is low (0.9%), it is not zero. However, this is non-modifiable because she has no signs or symptoms of the following \"active cardiac conditions\"- acute coronary syndrome, acutely decompensated congestive heart failure, uncontrolled arrhythmias, or significant valvular disease. Therefore, recommend proceeding with the planned surgery without further delay.    Patient is to return in 6 months or sooner if needed         "

## 2021-03-18 ENCOUNTER — IMMUNIZATION (OUTPATIENT)
Dept: VACCINE CLINIC | Facility: HOSPITAL | Age: 61
End: 2021-03-18

## 2021-03-18 ENCOUNTER — TRANSCRIBE ORDERS (OUTPATIENT)
Dept: ADMINISTRATIVE | Facility: HOSPITAL | Age: 61
End: 2021-03-18

## 2021-03-18 DIAGNOSIS — Z12.31 ENCOUNTER FOR SCREENING MAMMOGRAM FOR MALIGNANT NEOPLASM OF BREAST: Primary | ICD-10-CM

## 2021-03-18 PROCEDURE — 0011A: CPT | Performed by: OBSTETRICS & GYNECOLOGY

## 2021-03-18 PROCEDURE — 91301 HC SARSCO02 VAC 100MCG/0.5ML IM: CPT | Performed by: OBSTETRICS & GYNECOLOGY

## 2021-04-15 ENCOUNTER — IMMUNIZATION (OUTPATIENT)
Dept: VACCINE CLINIC | Facility: HOSPITAL | Age: 61
End: 2021-04-15

## 2021-04-15 PROCEDURE — 0012A: CPT | Performed by: OBSTETRICS & GYNECOLOGY

## 2021-04-15 PROCEDURE — 91301 HC SARSCO02 VAC 100MCG/0.5ML IM: CPT | Performed by: OBSTETRICS & GYNECOLOGY

## 2021-05-19 ENCOUNTER — TRANSCRIBE ORDERS (OUTPATIENT)
Dept: ADMINISTRATIVE | Facility: HOSPITAL | Age: 61
End: 2021-05-19

## 2021-05-19 DIAGNOSIS — R00.2 PALPITATIONS: ICD-10-CM

## 2021-05-19 DIAGNOSIS — R10.11 RIGHT UPPER QUADRANT PAIN: Primary | ICD-10-CM

## 2021-05-20 ENCOUNTER — HOSPITAL ENCOUNTER (OUTPATIENT)
Dept: ULTRASOUND IMAGING | Facility: HOSPITAL | Age: 61
Discharge: HOME | End: 2021-05-20

## 2021-05-20 ENCOUNTER — HOSPITAL ENCOUNTER (OUTPATIENT)
Dept: CARDIOLOGY | Facility: HOSPITAL | Age: 61
Discharge: HOME | End: 2021-05-20

## 2021-05-20 DIAGNOSIS — R10.11 RIGHT UPPER QUADRANT PAIN: ICD-10-CM

## 2021-05-20 DIAGNOSIS — R00.2 PALPITATIONS: ICD-10-CM

## 2021-05-20 PROCEDURE — 76705 ECHO EXAM OF ABDOMEN: CPT

## 2021-06-01 ENCOUNTER — APPOINTMENT (OUTPATIENT)
Dept: MAMMOGRAPHY | Facility: HOSPITAL | Age: 61
End: 2021-06-01

## 2021-06-04 ENCOUNTER — HOSPITAL ENCOUNTER (OUTPATIENT)
Dept: MAMMOGRAPHY | Facility: HOSPITAL | Age: 61
Discharge: HOME | End: 2021-06-04
Admitting: INTERNAL MEDICINE

## 2021-06-04 DIAGNOSIS — Z12.31 ENCOUNTER FOR SCREENING MAMMOGRAM FOR MALIGNANT NEOPLASM OF BREAST: ICD-10-CM

## 2021-06-04 PROCEDURE — 77063 BREAST TOMOSYNTHESIS BI: CPT

## 2021-06-04 PROCEDURE — 77067 SCR MAMMO BI INCL CAD: CPT

## 2021-06-09 ENCOUNTER — HOSPITAL ENCOUNTER (OUTPATIENT)
Dept: MAMMOGRAPHY | Facility: HOSPITAL | Age: 61
Discharge: HOME | End: 2021-06-09

## 2021-06-09 ENCOUNTER — HOSPITAL ENCOUNTER (OUTPATIENT)
Dept: ULTRASOUND IMAGING | Facility: HOSPITAL | Age: 61
Discharge: HOME | End: 2021-06-09

## 2021-06-09 DIAGNOSIS — R92.8 ABNORMAL MAMMOGRAM: ICD-10-CM

## 2021-06-09 PROCEDURE — 76642 ULTRASOUND BREAST LIMITED: CPT

## 2021-06-09 PROCEDURE — G0279 TOMOSYNTHESIS, MAMMO: HCPCS

## 2021-06-09 PROCEDURE — 77065 DX MAMMO INCL CAD UNI: CPT

## 2021-06-15 ENCOUNTER — TRANSCRIBE ORDERS (OUTPATIENT)
Dept: ADMINISTRATIVE | Facility: HOSPITAL | Age: 61
End: 2021-06-15

## 2021-06-15 DIAGNOSIS — R10.13 EPIGASTRIC PAIN: Primary | ICD-10-CM

## 2021-06-21 ENCOUNTER — TRANSCRIBE ORDERS (OUTPATIENT)
Dept: ADMINISTRATIVE | Facility: HOSPITAL | Age: 61
End: 2021-06-21

## 2021-06-21 DIAGNOSIS — R10.13 EPIGASTRIC PAIN: Primary | ICD-10-CM

## 2021-06-21 LAB
MAXIMAL PREDICTED HEART RATE: 157 BPM
STRESS TARGET HR: 133 BPM

## 2021-06-24 ENCOUNTER — HOSPITAL ENCOUNTER (OUTPATIENT)
Dept: CT IMAGING | Facility: HOSPITAL | Age: 61
Discharge: HOME | End: 2021-06-24
Admitting: PHYSICIAN ASSISTANT

## 2021-06-24 DIAGNOSIS — R10.13 EPIGASTRIC PAIN: ICD-10-CM

## 2021-06-24 LAB — CREAT BLDA-MCNC: 0.6 MG/DL (ref 0.6–1.3)

## 2021-06-24 PROCEDURE — 74177 CT ABD & PELVIS W/CONTRAST: CPT

## 2021-06-24 PROCEDURE — 25010000002 IOPAMIDOL 61 % SOLUTION: Performed by: PHYSICIAN ASSISTANT

## 2021-06-24 PROCEDURE — 82565 ASSAY OF CREATININE: CPT

## 2021-06-24 RX ADMIN — IOPAMIDOL 100 ML: 612 INJECTION, SOLUTION INTRAVENOUS at 08:20

## 2021-06-24 RX ADMIN — IOPAMIDOL 50 ML: 612 INJECTION, SOLUTION INTRAVENOUS at 08:20

## 2021-06-29 ENCOUNTER — HOSPITAL ENCOUNTER (OUTPATIENT)
Dept: NUCLEAR MEDICINE | Facility: HOSPITAL | Age: 61
Discharge: HOME | End: 2021-06-29

## 2021-06-29 DIAGNOSIS — R10.13 EPIGASTRIC PAIN: ICD-10-CM

## 2021-06-29 PROCEDURE — 0 TECHNETIUM TC 99M MEBROFENIN KIT: Performed by: PHYSICIAN ASSISTANT

## 2021-06-29 PROCEDURE — A9537 TC99M MEBROFENIN: HCPCS | Performed by: PHYSICIAN ASSISTANT

## 2021-06-29 PROCEDURE — 78226 HEPATOBILIARY SYSTEM IMAGING: CPT

## 2021-06-29 RX ORDER — KIT FOR THE PREPARATION OF TECHNETIUM TC 99M MEBROFENIN 45 MG/10ML
1 INJECTION, POWDER, LYOPHILIZED, FOR SOLUTION INTRAVENOUS
Status: COMPLETED | OUTPATIENT
Start: 2021-06-29 | End: 2021-06-29

## 2021-06-29 RX ADMIN — MEBROFENIN 1 DOSE: 45 INJECTION, POWDER, LYOPHILIZED, FOR SOLUTION INTRAVENOUS at 10:05

## 2021-08-04 ENCOUNTER — OFFICE VISIT (OUTPATIENT)
Dept: CARDIOLOGY | Facility: CLINIC | Age: 61
End: 2021-08-04

## 2021-08-04 VITALS
DIASTOLIC BLOOD PRESSURE: 85 MMHG | RESPIRATION RATE: 20 BRPM | HEIGHT: 68 IN | BODY MASS INDEX: 44.41 KG/M2 | HEART RATE: 81 BPM | OXYGEN SATURATION: 99 % | WEIGHT: 293 LBS | SYSTOLIC BLOOD PRESSURE: 145 MMHG

## 2021-08-04 DIAGNOSIS — G47.39 OTHER SLEEP APNEA: ICD-10-CM

## 2021-08-04 DIAGNOSIS — E66.01 CLASS 3 SEVERE OBESITY DUE TO EXCESS CALORIES WITH SERIOUS COMORBIDITY AND BODY MASS INDEX (BMI) OF 45.0 TO 49.9 IN ADULT (HCC): ICD-10-CM

## 2021-08-04 DIAGNOSIS — E11.9 TYPE 2 DIABETES MELLITUS WITHOUT COMPLICATION, WITHOUT LONG-TERM CURRENT USE OF INSULIN (HCC): ICD-10-CM

## 2021-08-04 DIAGNOSIS — E03.9 HYPOTHYROIDISM, UNSPECIFIED TYPE: ICD-10-CM

## 2021-08-04 DIAGNOSIS — E78.5 DYSLIPIDEMIA: ICD-10-CM

## 2021-08-04 DIAGNOSIS — Z86.79 HISTORY OF CONGESTIVE HEART FAILURE IN ADULTHOOD: Primary | ICD-10-CM

## 2021-08-04 DIAGNOSIS — I10 ESSENTIAL HYPERTENSION: ICD-10-CM

## 2021-08-04 PROCEDURE — 99214 OFFICE O/P EST MOD 30 MIN: CPT | Performed by: NURSE PRACTITIONER

## 2021-08-04 RX ORDER — OMEPRAZOLE 40 MG/1
40 CAPSULE, DELAYED RELEASE ORAL DAILY
COMMUNITY

## 2021-08-04 NOTE — PROGRESS NOTES
Subjective:     Encounter Date: 08/04/2021      Patient ID: Kacie Baron is a 61 y.o. female with a history of congestive heart failure, hypertension, hyperlipidemia, type 2 diabetes, obstructive sleep apnea, hypothyroidism and obesity.     Chief Complaint: routine follow up  Congestive Heart Failure  Presents for follow-up visit. Pertinent negatives include no chest pain, chest pressure, edema, orthopnea, palpitations, paroxysmal nocturnal dyspnea or shortness of breath. The symptoms have been stable. Compliance with total regimen is %. Compliance with diet is 51-75%. Compliance with exercise is 51-75%. Compliance with medications is %.   Hypertension  This is a chronic problem. The current episode started more than 1 year ago. The problem is controlled. Associated symptoms include malaise/fatigue. Pertinent negatives include no chest pain, orthopnea, palpitations, peripheral edema or shortness of breath. Risk factors for coronary artery disease include diabetes mellitus, dyslipidemia and obesity. Current antihypertension treatment includes beta blockers and angiotensin blockers. There are no compliance problems.  Hypertensive end-organ damage includes heart failure. Identifiable causes of hypertension include a thyroid problem.   Hyperlipidemia  This is a chronic problem. The current episode started more than 1 year ago. Pertinent negatives include no chest pain or shortness of breath.     Patient presents today for routine follow up. Patient was initially diagnosed with CHF in June 2013. Later a subsequent Echo in 2014 showed that her LVEF normalized. Patient reports that she had a right total knee replacement that she had lost 20 lbs prior to. However when she had to be off her knee for recovery she gained the weight back.   She has started to have stomach pain and found out that her gallbladder is low functioning. She reports that she is seeing surgery in [place] next week. Patient reports that  she was traveling last month and she was sitting in the car a lot. She reports that she didn't take her Lasix while she was on the trip due to needing to stop and use the restroom more frequently. She states that she has taken it as needed since she has been home again. She developed leg swelling. Patient reports that she was on Ozempic, her PCP wanted her to be off for a month and has since restarted it. Patient reports that her BS has been up lately; it was 203 this am. Patient reports that she has been waking up in middle of night with a fullness in her chest, she reports that it has been short lived and resolves on its own. She reports increased stress with one of her children and having to stay with them for 2 weeks. She wore a holter monitor from PCP due to feeling in chest--she doesn't remember having symptoms while wearing the monitor. She reports monitoring her Bp and it running 119-130/65-80. She denies any chest pain. Patient denies any shortness of breath, orthopnea or PND. Patient follows with Dr Mccoy as PCP.       Previous Cardiac Testing and Procedures:   -[facility] (7/9/13) normal coronary arteries  -Holter monitor 5/19/2021: rare ventricular ectopic beats, rare atrial ectopic beats with runs of nonsustained SVT.   -Lipid panel (5/2020) total chol 196, hdl 32, ldl 119, triglycerides 224  -Hgb A1C (5/2020): 5.3    The following portions of the patient's history were reviewed and updated as appropriate: allergies, current medications, past family history, past medical history, past social history, past surgical history and problem list.    No Known Allergies    Current Outpatient Medications:   •  aspirin 81 MG EC tablet, Take 81 mg by mouth Daily., Disp: , Rfl:   •  carvedilol (COREG) 6.25 MG tablet, Take 1 tablet by mouth 2 (Two) Times a Day With Meals., Disp: 60 tablet, Rfl: 11  •  Cholecalciferol (VITAMIN D3) 5000 UNITS tablet, Take  by mouth., Disp: , Rfl:   •  fenofibrate 160 MG tablet, Take 160 mg  by mouth Daily., Disp: , Rfl:   •  FLUoxetine (PROzac) 40 MG capsule, Take 40 mg by mouth Daily., Disp: , Rfl:   •  furosemide (LASIX) 20 MG tablet, Take 20 mg by mouth As Needed., Disp: , Rfl:   •  meclizine 25 MG chewable tablet chewable tablet, Chew 25 mg 3 (Three) Times a Day As Needed., Disp: , Rfl:   •  omeprazole (priLOSEC) 40 MG capsule, Take 40 mg by mouth Daily., Disp: , Rfl:   •  Semaglutide, 1 MG/DOSE, (OZEMPIC) 2 MG/1.5ML solution pen-injector, Inject  under the skin into the appropriate area as directed 1 (One) Time Per Week., Disp: , Rfl:   •  Thyroid 120 MG PO tablet, Take 120 mg by mouth Daily., Disp: , Rfl:   •  traZODone (DESYREL) 75 MG half tablet, Take 100 mg by mouth Every Night., Disp: , Rfl:   Past Medical History:   Diagnosis Date   • Arthritis    • CHF (congestive heart failure) (CMS/HCC)    • Depression    • Diabetes mellitus (CMS/HCC)    • DM (diabetes mellitus screen)    • GERD (gastroesophageal reflux disease)    • HLD (hyperlipidemia)    • HTN (hypertension)    • Sleep apnea    • Thyroid condition      Social History     Socioeconomic History   • Marital status:      Spouse name: Not on file   • Number of children: Not on file   • Years of education: Not on file   • Highest education level: Not on file   Tobacco Use   • Smoking status: Never Smoker   • Smokeless tobacco: Never Used   Substance and Sexual Activity   • Alcohol use: No   • Drug use: Defer   • Sexual activity: Yes     Partners: Male       Review of Systems   Constitutional: Positive for malaise/fatigue and weight gain.   Cardiovascular: Positive for leg swelling. Negative for chest pain, dyspnea on exertion, orthopnea, palpitations and syncope.   Respiratory: Negative for shortness of breath.    Gastrointestinal: Positive for nausea.   All other systems reviewed and are negative.        ECG 12 Lead    Date/Time: 8/5/2021 9:29 AM  Performed by: Behzad Phipps APRN  Authorized by: Behzad Phipps APRN   Comparison:  "compared with previous ECG from 6/18/2020  Similar to previous ECG  Rhythm: sinus rhythm  Rate: normal  BPM: 81                 Objective:     Physical Exam  Vitals reviewed.   Constitutional:       General: She is not in acute distress.     Appearance: Normal appearance. She is well-developed. She is morbidly obese.   HENT:      Head: Normocephalic and atraumatic.      Nose: Nose normal.   Eyes:      Pupils: Pupils are equal, round, and reactive to light.   Neck:      Vascular: No carotid bruit.   Cardiovascular:      Rate and Rhythm: Normal rate and regular rhythm.      Heart sounds: Normal heart sounds. No murmur heard.     Pulmonary:      Effort: Pulmonary effort is normal. No respiratory distress.      Breath sounds: Normal breath sounds. No wheezing or rales.   Abdominal:      General: There is no distension.      Palpations: Abdomen is soft.   Musculoskeletal:         General: Normal range of motion.      Cervical back: Normal range of motion and neck supple.   Skin:     General: Skin is warm.      Findings: No erythema or rash.   Neurological:      Mental Status: She is alert and oriented to person, place, and time.   Psychiatric:         Speech: Speech normal.         Behavior: Behavior normal.         Thought Content: Thought content normal.         Judgment: Judgment normal.         /85 (BP Location: Right arm, Patient Position: Sitting, Cuff Size: Adult)   Pulse 81   Resp 20   Ht 172.7 cm (68\")   Wt (!) 139 kg (306 lb)   SpO2 99%   Breastfeeding No   BMI 46.53 kg/m²     Lab Review:       5/26/2020:             I have personally reviewed labs, past office nt  Assessment:          Diagnosis Plan   1. History of congestive heart failure in adulthood     2. Essential hypertension     3. Dyslipidemia     4. Type 2 diabetes mellitus without complication, without long-term current use of insulin (CMS/[facility])     5. Hypothyroidism, unspecified type     6. Other sleep apnea     7. Class 3 severe obesity " due to excess calories with serious comorbidity and body mass index (BMI) of 45.0 to 49.9 in adult (CMS/[facility])            Plan:       1. CHF: Hx of systolic CHF that normalized. Compensated. Continue carvedilol and Lasix as needed. Reviewed signs and symptoms of CHF and what to report to office with patient. Patient instructed to restrict sodium and record daily weight. Patient is to report weight gain of greater than 2 lbs overnight or 5 lbs in 1 week. Patient verbalized understanding of instructions and plan of care.     2. HTN: controlled continue current medications    3. HLD: On Fenofibrate. Managed by PCP.     4. Type 2 DM: Controlled with diet. Managed by PCP. A1C 5.3    5. Hypothyroidism: Followed by [facility]     6. Sleep apnea: Currently wearing CPAP at night    7. Obesity: Patient's Body mass index is 46.53 kg/m². BMI is above normal parameters. Recommendations include: exercise counseling and nutrition counseling.    Patient is to return in 6 months or sooner if needed

## 2021-08-05 PROCEDURE — 93000 ELECTROCARDIOGRAM COMPLETE: CPT | Performed by: NURSE PRACTITIONER

## 2021-08-17 ENCOUNTER — TELEPHONE (OUTPATIENT)
Dept: CARDIOLOGY | Facility: CLINIC | Age: 61
End: 2021-08-17

## 2021-08-17 NOTE — TELEPHONE ENCOUNTER
----- Message from MARICARMEN Fountain sent at 8/17/2021  9:47 AM CDT -----  It is safe for patient to hold aspirin and proceed with surgery from a cardiac standpoint. Thanks    ----- Message -----  From: Naomi Monae MA  Sent: 8/13/2021   1:31 PM CDT  To: MARICARMEN Fountain    CARDIAC CLEARANCE REQ

## 2022-04-11 ENCOUNTER — OFFICE VISIT (OUTPATIENT)
Dept: CARDIOLOGY | Facility: CLINIC | Age: 62
End: 2022-04-11

## 2022-04-11 VITALS
DIASTOLIC BLOOD PRESSURE: 70 MMHG | OXYGEN SATURATION: 98 % | SYSTOLIC BLOOD PRESSURE: 120 MMHG | HEART RATE: 69 BPM | BODY MASS INDEX: 44.41 KG/M2 | HEIGHT: 68 IN | WEIGHT: 293 LBS

## 2022-04-11 DIAGNOSIS — E66.01 CLASS 3 SEVERE OBESITY DUE TO EXCESS CALORIES WITH SERIOUS COMORBIDITY AND BODY MASS INDEX (BMI) OF 45.0 TO 49.9 IN ADULT: ICD-10-CM

## 2022-04-11 DIAGNOSIS — I10 ESSENTIAL HYPERTENSION: ICD-10-CM

## 2022-04-11 DIAGNOSIS — E78.5 DYSLIPIDEMIA: ICD-10-CM

## 2022-04-11 DIAGNOSIS — I50.22 CHRONIC SYSTOLIC CONGESTIVE HEART FAILURE: Primary | ICD-10-CM

## 2022-04-11 PROCEDURE — 99214 OFFICE O/P EST MOD 30 MIN: CPT | Performed by: INTERNAL MEDICINE

## 2022-04-11 RX ORDER — EMPAGLIFLOZIN 25 MG/1
25 TABLET, FILM COATED ORAL DAILY
COMMUNITY
Start: 2022-03-27

## 2022-04-11 RX ORDER — ROSUVASTATIN CALCIUM 10 MG/1
10 TABLET, COATED ORAL DAILY
COMMUNITY
Start: 2022-03-27

## 2022-04-11 NOTE — PROGRESS NOTES
Reason for Visit: cardiovascular follow up.    HPI:  Kacie Baron is a 62 y.o. female is here today for follow-up.  She has been doing well and not having any issues or complaints.  She has a new puppy, which keeps her busy and has also been a source of stress.  She has no cardiac symptoms and denies any chest pain, palpitations, syncope, PND, or orthopnea.  She recently started on Jardiance and has noted some dizziness on occasion.      Previous Cardiac Testing and Procedures:  - [facility] (7/9/13) normal coronary arteries  - Echo (11/11/2014) EF 60%, trace TR, normal RVSP  - Lipid panel (12/2/16) 206/35/133/191  - Lipid panel (10/10/17) 214/41/137/178  - Lipid panel (4/28/2020) 196/32/119/224  - Holter monitor (5/20/2021) rare PACs and PVCs, runs of nonsustained SVT, no symptoms reported    Patient Active Problem List   Diagnosis   • Depression   • Anxiety   • Class 3 severe obesity due to excess calories with serious comorbidity and body mass index (BMI) of 45.0 to 49.9 in adult (HCC)   • Dyslipidemia   • Essential hypertension   • Dyspepsia   • Hypothyroid   • Diabetes (HCC)   • Other sleep apnea   • History of congestive heart failure in adulthood   • HLD (hyperlipidemia)       Social History     Tobacco Use   • Smoking status: Never Smoker   • Smokeless tobacco: Never Used   Substance Use Topics   • Alcohol use: No   • Drug use: Defer       Family History   Problem Relation Age of Onset   • Heart disease Mother    • Heart disease Father    • Heart attack Father    • Breast cancer Maternal Grandmother    • Breast cancer Paternal Grandmother        The following portions of the patient's history were reviewed and updated as appropriate: allergies, current medications, past family history, past medical history, past social history, past surgical history and problem list.      Current Outpatient Medications:   •  aspirin 81 MG EC tablet, Take 81 mg by mouth Daily., Disp: , Rfl:   •  carvedilol (COREG) 6.25 MG tablet,  "Take 1 tablet by mouth 2 (Two) Times a Day With Meals., Disp: 60 tablet, Rfl: 11  •  Cholecalciferol (VITAMIN D3) 5000 UNITS tablet, Take  by mouth., Disp: , Rfl:   •  FLUoxetine (PROzac) 40 MG capsule, Take 40 mg by mouth Daily., Disp: , Rfl:   •  furosemide (LASIX) 20 MG tablet, Take 20 mg by mouth As Needed., Disp: , Rfl:   •  Jardiance 25 MG tablet tablet, Take 25 mg by mouth Daily., Disp: , Rfl:   •  omeprazole (priLOSEC) 40 MG capsule, Take 40 mg by mouth Daily., Disp: , Rfl:   •  rosuvastatin (CRESTOR) 10 MG tablet, Take 10 mg by mouth Daily., Disp: , Rfl:   •  Semaglutide, 1 MG/DOSE, (OZEMPIC) 2 MG/1.5ML solution pen-injector, Inject  under the skin into the appropriate area as directed 1 (One) Time Per Week., Disp: , Rfl:   •  THYROID PO, Take 150 mg by mouth Daily., Disp: , Rfl:   •  traZODone (DESYREL) 75 MG half tablet, Take 100 mg by mouth Every Night., Disp: , Rfl:   •  meclizine 25 MG chewable tablet chewable tablet, Chew 25 mg 3 (Three) Times a Day As Needed., Disp: , Rfl:     Review of Systems   Constitutional: Negative for chills and fever.   Cardiovascular: Negative for chest pain and paroxysmal nocturnal dyspnea.   Respiratory: Negative for cough and shortness of breath.    Skin: Negative for rash.   Gastrointestinal: Negative for abdominal pain and heartburn.   Neurological: Positive for dizziness. Negative for numbness.       Objective   /70 (BP Location: Left arm, Patient Position: Sitting, Cuff Size: Adult)   Pulse 69   Ht 172.7 cm (67.99\")   Wt 136 kg (300 lb)   SpO2 98%   BMI 45.63 kg/m²   Constitutional:       Appearance: Well-developed. Morbidly obese.   HENT:      Head: Normocephalic and atraumatic.   Pulmonary:      Effort: Pulmonary effort is normal.      Breath sounds: Normal breath sounds.   Cardiovascular:      Normal rate. Regular rhythm.      Murmurs: There is no murmur.      No gallop. No click.   Edema:     Peripheral edema absent.   Skin:     General: Skin is warm " and dry.   Neurological:      Mental Status: Alert and oriented to person, place, and time.       Procedures      ICD-10-CM ICD-9-CM   1. Chronic systolic congestive heart failure (HCC)  I50.22 428.22     428.0   2. Dyslipidemia  E78.5 272.4   3. Class 3 severe obesity due to excess calories with serious comorbidity and body mass index (BMI) of 45.0 to 49.9 in adult (HCC)  E66.01 278.01    Z68.42 V85.42   4. Essential hypertension  I10 401.9         Assessment/Plan:  1.  Congestive heart failure: History of systolic CHF that normalized.  EF 60% on echo from 11/11/2014.  Continue carvedilol, furosemide, and Jardiance.     2. Dyslipidemia: Lipid panel from 4/28/2020 showed borderline control.  Obtain copy of most recent lipid panel and continue rosuvastatin.    3. Obesity:  Patient's Body mass index is 45.63 kg/m². indicating that she is morbidly obese (BMI > 40 or > 35 with obesity - related health condition). Obesity-related health conditions include the following: hypertension and dyslipidemias. Obesity is improving with lifestyle modifications. BMI is is above average; BMI management plan is completed. We discussed portion control and increasing exercise.      4. Essential hypertension:  Blood pressure is normal today.  Continue carvedilol.

## 2022-06-03 ENCOUNTER — TRANSCRIBE ORDERS (OUTPATIENT)
Dept: ADMINISTRATIVE | Facility: HOSPITAL | Age: 62
End: 2022-06-03

## 2022-06-03 ENCOUNTER — HOSPITAL ENCOUNTER (OUTPATIENT)
Dept: GENERAL RADIOLOGY | Facility: HOSPITAL | Age: 62
Discharge: HOME | End: 2022-06-03
Admitting: INTERNAL MEDICINE

## 2022-06-03 DIAGNOSIS — R07.9 CHEST PAIN, UNSPECIFIED TYPE: ICD-10-CM

## 2022-06-03 DIAGNOSIS — R07.9 CHEST PAIN, UNSPECIFIED TYPE: Primary | ICD-10-CM

## 2022-06-03 DIAGNOSIS — I47.1 PAROXYSMAL SUPRAVENTRICULAR TACHYCARDIA: ICD-10-CM

## 2022-06-03 PROCEDURE — 71046 X-RAY EXAM CHEST 2 VIEWS: CPT

## 2022-06-07 ENCOUNTER — HOSPITAL ENCOUNTER (OUTPATIENT)
Dept: CT IMAGING | Facility: HOSPITAL | Age: 62
Discharge: HOME | End: 2022-06-07
Admitting: INTERNAL MEDICINE

## 2022-06-07 DIAGNOSIS — R07.9 CHEST PAIN, UNSPECIFIED TYPE: ICD-10-CM

## 2022-06-07 LAB — CREAT BLDA-MCNC: 0.5 MG/DL (ref 0.6–1.3)

## 2022-06-07 PROCEDURE — 0 IOPAMIDOL PER 1 ML: Performed by: INTERNAL MEDICINE

## 2022-06-07 PROCEDURE — 71275 CT ANGIOGRAPHY CHEST: CPT

## 2022-06-07 PROCEDURE — 82565 ASSAY OF CREATININE: CPT

## 2022-06-07 RX ADMIN — IOPAMIDOL 100 ML: 755 INJECTION, SOLUTION INTRAVENOUS at 09:53

## 2022-07-26 ENCOUNTER — TRANSCRIBE ORDERS (OUTPATIENT)
Dept: ADMINISTRATIVE | Facility: HOSPITAL | Age: 62
End: 2022-07-26

## 2022-07-26 DIAGNOSIS — Z12.31 ENCOUNTER FOR SCREENING MAMMOGRAM FOR MALIGNANT NEOPLASM OF BREAST: Primary | ICD-10-CM

## 2022-08-02 ENCOUNTER — HOSPITAL ENCOUNTER (OUTPATIENT)
Dept: MAMMOGRAPHY | Facility: HOSPITAL | Age: 62
Discharge: HOME | End: 2022-08-02
Admitting: INTERNAL MEDICINE

## 2022-08-02 DIAGNOSIS — Z12.31 ENCOUNTER FOR SCREENING MAMMOGRAM FOR MALIGNANT NEOPLASM OF BREAST: ICD-10-CM

## 2022-08-02 PROCEDURE — 77063 BREAST TOMOSYNTHESIS BI: CPT

## 2022-08-02 PROCEDURE — 77067 SCR MAMMO BI INCL CAD: CPT

## 2022-10-04 ENCOUNTER — HOSPITAL ENCOUNTER (OUTPATIENT)
Dept: GENERAL RADIOLOGY | Facility: HOSPITAL | Age: 62
Discharge: HOME | End: 2022-10-04
Admitting: INTERNAL MEDICINE

## 2022-10-04 ENCOUNTER — TRANSCRIBE ORDERS (OUTPATIENT)
Dept: ADMINISTRATIVE | Facility: HOSPITAL | Age: 62
End: 2022-10-04

## 2022-10-04 DIAGNOSIS — S99.921A INJURY OF FOOT, RIGHT, INITIAL ENCOUNTER: Primary | ICD-10-CM

## 2022-10-04 PROCEDURE — 73630 X-RAY EXAM OF FOOT: CPT

## 2022-10-17 ENCOUNTER — HOSPITAL ENCOUNTER (EMERGENCY)
Facility: HOSPITAL | Age: 62
Discharge: HOME | End: 2022-10-17
Admitting: EMERGENCY MEDICINE

## 2022-10-17 ENCOUNTER — APPOINTMENT (OUTPATIENT)
Dept: GENERAL RADIOLOGY | Facility: HOSPITAL | Age: 62
End: 2022-10-17

## 2022-10-17 ENCOUNTER — APPOINTMENT (OUTPATIENT)
Dept: CT IMAGING | Facility: HOSPITAL | Age: 62
End: 2022-10-17

## 2022-10-17 VITALS
HEIGHT: 68 IN | OXYGEN SATURATION: 100 % | SYSTOLIC BLOOD PRESSURE: 140 MMHG | RESPIRATION RATE: 16 BRPM | DIASTOLIC BLOOD PRESSURE: 73 MMHG | HEART RATE: 71 BPM | TEMPERATURE: 98 F | WEIGHT: 261 LBS | BODY MASS INDEX: 39.56 KG/M2

## 2022-10-17 DIAGNOSIS — S80.01XA CONTUSION OF RIGHT KNEE, INITIAL ENCOUNTER: ICD-10-CM

## 2022-10-17 DIAGNOSIS — S00.03XA CONTUSION OF SCALP, INITIAL ENCOUNTER: Primary | ICD-10-CM

## 2022-10-17 PROCEDURE — 70450 CT HEAD/BRAIN W/O DYE: CPT

## 2022-10-17 PROCEDURE — 99283 EMERGENCY DEPT VISIT LOW MDM: CPT

## 2022-10-17 PROCEDURE — 73562 X-RAY EXAM OF KNEE 3: CPT

## 2022-10-17 NOTE — ED PROVIDER NOTES
Subjective   History of Present Illness  Patient is a 62-year-old white female presents emergency department after sustaining a fall at home about 30 minutes prior to arrival.  She states she was trying to get a shoe out of her dog's mouth and lost her balance falling over striking her head on the tile floor.  She also struck her right knee on the floor as well.  She denies any loss of consciousness.  She has a moderate amount of soft tissue swelling noted to the right frontal scalp.  She denies any nausea or vomiting.  No blurred vision or diplopia.  She does have a headache.  Denies any neck pain.  She denies any numbness or focal weakness.    History provided by:  Patient   used: No        Review of Systems   Constitutional: Negative.    HENT:        Patient is a 62-year-old white female presents emergency department after sustaining a fall at home about 30 minutes prior to arrival.  She states she was trying to get a shoe out of her dog's mouth and lost her balance falling over striking her head on the tile floor.  She also struck her right knee on the floor as well.  She denies any loss of consciousness.  She has a moderate amount of soft tissue swelling noted to the right frontal scalp.  She denies any nausea or vomiting.  No blurred vision or diplopia.  She does have a headache.  Denies any neck pain.  She denies any numbness or focal weakness.     Eyes: Negative.    Respiratory: Negative.    Cardiovascular: Negative.    Gastrointestinal: Negative.    Endocrine: Negative.    Genitourinary: Negative.    Musculoskeletal: Positive for joint swelling.        Right knee pain- states that she fell and landed on right knee. Hx of total knee replacement about 2 years ago    Skin: Negative.    Allergic/Immunologic: Negative.    Neurological: Negative.    Hematological: Negative.    Psychiatric/Behavioral: Negative.    All other systems reviewed and are negative.      Past Medical History:    Diagnosis Date   • Arthritis    • CHF (congestive heart failure) (HCC)    • Depression    • Diabetes mellitus (HCC)    • DM (diabetes mellitus screen)    • GERD (gastroesophageal reflux disease)    • HLD (hyperlipidemia)    • HTN (hypertension)    • Sleep apnea    • Thyroid condition        No Known Allergies    Past Surgical History:   Procedure Laterality Date   • BACK SURGERY      2007   • CARDIAC CATHETERIZATION     • CHOLECYSTECTOMY  09/23/2021    [place]/ DR PEREZ   • COLONOSCOPY      2016   • CYST REMOVAL     • DILATATION AND CURETTAGE     • TONSILLECTOMY AND ADENOIDECTOMY     • TUBAL ABDOMINAL LIGATION         Family History   Problem Relation Age of Onset   • Heart disease Mother    • Heart disease Father    • Heart attack Father    • Breast cancer Maternal Grandmother    • Breast cancer Paternal Grandmother        Social History     Socioeconomic History   • Marital status:    Tobacco Use   • Smoking status: Never   • Smokeless tobacco: Never   Substance and Sexual Activity   • Alcohol use: No   • Drug use: Defer   • Sexual activity: Yes     Partners: Male       Prior to Admission medications    Medication Sig Start Date End Date Taking? Authorizing Provider   aspirin 81 MG EC tablet Take 81 mg by mouth Daily.    Kang Sharma MD   carvedilol (COREG) 6.25 MG tablet Take 1 tablet by mouth 2 (Two) Times a Day With Meals. 6/18/20   Behzad Phipps APRN   Cholecalciferol (VITAMIN D3) 5000 UNITS tablet Take  by mouth.    Kang Sharma MD   FLUoxetine (PROzac) 40 MG capsule Take 40 mg by mouth Daily.    Kang Sharma MD   furosemide (LASIX) 20 MG tablet Take 20 mg by mouth As Needed.    Kang Sharma MD   Jardiance 25 MG tablet tablet Take 25 mg by mouth Daily. 3/27/22   Kang Sharma MD   meclizine 25 MG chewable tablet chewable tablet Chew 25 mg 3 (Three) Times a Day As Needed.    Kang Sharma MD   omeprazole (priLOSEC) 40 MG capsule Take 40 mg by  "mouth Daily.    Kang Sharma MD   rosuvastatin (CRESTOR) 10 MG tablet Take 10 mg by mouth Daily. 3/27/22   Kang Sharma MD   Semaglutide, 1 MG/DOSE, (OZEMPIC) 2 MG/1.5ML solution pen-injector Inject  under the skin into the appropriate area as directed 1 (One) Time Per Week.    Kang Sharma MD   THYROID PO Take 150 mg by mouth Daily.    Kang Sharma MD   traZODone (DESYREL) 75 MG half tablet Take 100 mg by mouth Every Night.    Kang Sharma MD       /73 (BP Location: Right arm, Patient Position: Sitting)   Pulse 71   Temp 98 °F (36.7 °C) (Oral)   Resp 16   Ht 172.7 cm (68\")   Wt 118 kg (261 lb)   SpO2 100%   BMI 39.68 kg/m²     Objective   Physical Exam  Vitals and nursing note reviewed.   Constitutional:       Appearance: She is well-developed.   HENT:      Head: Normocephalic.      Comments: Moderate soft tissue swelling and ecchymosis  noted to right frontal scalp. No laceration noted. PEARLA. EOMS intact.   Eyes:      Conjunctiva/sclera: Conjunctivae normal.      Pupils: Pupils are equal, round, and reactive to light.   Neck:      Thyroid: No thyromegaly.      Trachea: No tracheal deviation.      Comments: No tenderness noted posterior cervical spine. No stepoff or laxity noted.   Cardiovascular:      Rate and Rhythm: Normal rate and regular rhythm.      Heart sounds: Normal heart sounds.   Pulmonary:      Effort: Pulmonary effort is normal. No respiratory distress.      Breath sounds: Normal breath sounds. No wheezing or rales.   Chest:      Chest wall: No tenderness.   Abdominal:      General: Bowel sounds are normal.      Palpations: Abdomen is soft.   Musculoskeletal:      Cervical back: Normal range of motion and neck supple.      Comments: Right knee: moderate pain on palpation of lateral aspect of right knee. Mild soft tissue swelling noted. No obvious deformity noted. No obvious joint effusion noted.    Skin:     General: Skin is warm and dry. "   Neurological:      Mental Status: She is alert and oriented to person, place, and time.      Cranial Nerves: No cranial nerve deficit.      Deep Tendon Reflexes: Reflexes are normal and symmetric.   Psychiatric:         Behavior: Behavior normal.         Thought Content: Thought content normal.         Judgment: Judgment normal.         Procedures         Lab Results (last 24 hours)     ** No results found for the last 24 hours. **          CT Head Without Contrast   Final Result   1. No acute intracranial process.   2. Right frontal scalp hematoma with no underlying calvarial injury.           This report was finalized on 10/17/2022 11:07 by Dr. Houston Tapia MD.      XR Knee 3 View Right   Final Result   1. Appropriate alignment of the right knee prosthesis with no acute   osseous pathology.   This report was finalized on 10/17/2022 09:26 by Dr. Katya Arango MD.          ED Course  ED Course as of 10/17/22 1315   Mon Oct 17, 2022   0935 Pending ct scan of head at this time. Xray of right knee- no acute fx noted. Prosthesis in place per radiologist report.  [CW]   1107 Pending ct scan of head report at this time  [CW]   1124 Ct head-negative for acute intracranial abnormality. Right frontal hematoma noted. Right knee: no fx noted. Prosthesis intact. Reviewed results with pt and pt spouse. Advised ice to head. Will be discharged home shortly in stable condition to return if symptoms worsen  [CW]      ED Course User Index  [CW] Priscila Mullins, MARICARMEN          MDM  Number of Diagnoses or Management Options  Contusion of right knee, initial encounter: minor  Contusion of scalp, initial encounter: minor     Amount and/or Complexity of Data Reviewed  Tests in the radiology section of CPT®: ordered and reviewed    Patient Progress  Patient progress: stable      Final diagnoses:   Contusion of scalp, initial encounter   Contusion of right knee, initial encounter          Priscila Mullins, MARICARMEN  10/17/22  0457

## 2022-10-17 NOTE — DISCHARGE INSTRUCTIONS
Return to ER if symptoms worsen   Ice to affected areas.   Follow up with Dr. Mccoy this week as needed

## 2023-05-08 ENCOUNTER — OFFICE VISIT (OUTPATIENT)
Dept: CARDIOLOGY | Facility: CLINIC | Age: 63
End: 2023-05-08
Payer: MEDICARE

## 2023-05-08 VITALS
HEART RATE: 75 BPM | BODY MASS INDEX: 43.8 KG/M2 | HEIGHT: 68 IN | OXYGEN SATURATION: 97 % | SYSTOLIC BLOOD PRESSURE: 128 MMHG | DIASTOLIC BLOOD PRESSURE: 71 MMHG | WEIGHT: 289 LBS

## 2023-05-08 DIAGNOSIS — E11.9 TYPE 2 DIABETES MELLITUS WITHOUT COMPLICATION, WITHOUT LONG-TERM CURRENT USE OF INSULIN: ICD-10-CM

## 2023-05-08 DIAGNOSIS — E66.01 CLASS 3 SEVERE OBESITY DUE TO EXCESS CALORIES WITH SERIOUS COMORBIDITY AND BODY MASS INDEX (BMI) OF 40.0 TO 44.9 IN ADULT: ICD-10-CM

## 2023-05-08 DIAGNOSIS — I50.22 CHRONIC SYSTOLIC CONGESTIVE HEART FAILURE: Primary | ICD-10-CM

## 2023-05-08 DIAGNOSIS — E78.5 DYSLIPIDEMIA: ICD-10-CM

## 2023-05-08 DIAGNOSIS — I10 ESSENTIAL HYPERTENSION: ICD-10-CM

## 2023-05-08 DIAGNOSIS — G47.39 OTHER SLEEP APNEA: ICD-10-CM

## 2023-05-08 DIAGNOSIS — E03.9 HYPOTHYROIDISM, UNSPECIFIED TYPE: ICD-10-CM

## 2023-05-08 NOTE — PROGRESS NOTES
Subjective:     Encounter Date: 05/08/2023      Patient ID: Kacie Baron is a 63 y.o. female with a history of congestive heart failure, hypertension, hyperlipidemia, type 2 diabetes, obstructive sleep apnea, hypothyroidism and obesity.     Chief Complaint: routine follow up  Congestive Heart Failure  Presents for follow-up visit. Pertinent negatives include no chest pain, chest pressure, edema, fatigue, near-syncope, orthopnea, palpitations, paroxysmal nocturnal dyspnea or shortness of breath. The symptoms have been stable. Compliance with total regimen is %. Compliance with diet is 51-75%. Compliance with exercise is 26-50%. Compliance with medications is %.   Hypertension  This is a chronic problem. The current episode started more than 1 year ago. The problem is controlled. Pertinent negatives include no chest pain, malaise/fatigue, orthopnea, palpitations, peripheral edema, PND or shortness of breath. Risk factors for coronary artery disease include diabetes mellitus, dyslipidemia and obesity. Current antihypertension treatment includes beta blockers and angiotensin blockers. There are no compliance problems.  Hypertensive end-organ damage includes heart failure. Identifiable causes of hypertension include a thyroid problem.   Hyperlipidemia  This is a chronic problem. The current episode started more than 1 year ago. The problem is controlled. Pertinent negatives include no chest pain or shortness of breath. Current antihyperlipidemic treatment includes statins. Risk factors for coronary artery disease include hypertension, obesity and dyslipidemia.     Patient presents today for management of congestive heart failure. Today she reports that she has been doing well. She denies any chest pain or dyspnea on exertion. She reports some leg swelling with traveling long distances. She denies any palpitations. she has some intermittent heart racing that lasts a couple of seconds and then resolves on  its own. She reports this occurring possibly once a month. She denies any dizziness or near syncope. She reports that she monitors her BP routinely and it has remained well controlled. Patient follows with Dr Mccoy as PCP.       Previous Cardiac Testing and Procedures:   -[facility] (7/9/13) normal coronary arteries  -Holter monitor 5/19/2021: rare ventricular ectopic beats, rare atrial ectopic beats with runs of nonsustained SVT.   -Lipid panel (5/2020) total chol 196, hdl 32, ldl 119, triglycerides 224  -Hgb A1C (5/2020): 5.3  -Holter monitor (5/20/2021) rare PACs and PVCs, runs of nonsustained SVT, no symptoms reported  -Hgb A1C (2/13/2023): 6.0  -Lipid panel (2/13/2023): total cholesterol 155, HDL 56, LDL 79 and triglycerides 114     The following portions of the patient's history were reviewed and updated as appropriate: allergies, current medications, past family history, past medical history, past social history, past surgical history and problem list.    No Known Allergies    Current Outpatient Medications:   •  aspirin 81 MG EC tablet, Take 1 tablet by mouth Daily., Disp: , Rfl:   •  carvedilol (COREG) 6.25 MG tablet, Take 1 tablet by mouth 2 (Two) Times a Day With Meals., Disp: 60 tablet, Rfl: 11  •  Cholecalciferol (VITAMIN D3) 5000 UNITS tablet, Take  by mouth., Disp: , Rfl:   •  FLUoxetine (PROzac) 40 MG capsule, Take 1 capsule by mouth Daily., Disp: , Rfl:   •  furosemide (LASIX) 20 MG tablet, Take 1 tablet by mouth As Needed., Disp: , Rfl:   •  Jardiance 25 MG tablet tablet, Take 1 tablet by mouth Daily., Disp: , Rfl:   •  meclizine 25 MG chewable tablet chewable tablet, Chew 1 tablet 3 (Three) Times a Day As Needed., Disp: , Rfl:   •  omeprazole (priLOSEC) 40 MG capsule, Take 1 capsule by mouth Daily., Disp: , Rfl:   •  rosuvastatin (CRESTOR) 10 MG tablet, Take 1 tablet by mouth Daily., Disp: , Rfl:   •  Semaglutide, 1 MG/DOSE, (OZEMPIC) 2 MG/1.5ML solution pen-injector, Inject  under the skin into the  appropriate area as directed 1 (One) Time Per Week., Disp: , Rfl:   •  THYROID PO, Take 150 mg by mouth Daily., Disp: , Rfl:   •  traZODone (DESYREL) 75 MG half tablet, Take 100 mg by mouth Every Night., Disp: , Rfl:   Past Medical History:   Diagnosis Date   • Arthritis    • CHF (congestive heart failure)    • Depression    • Diabetes mellitus    • DM (diabetes mellitus screen)    • GERD (gastroesophageal reflux disease)    • HLD (hyperlipidemia)    • HTN (hypertension)    • Sleep apnea    • Thyroid condition      Social History     Socioeconomic History   • Marital status:    Tobacco Use   • Smoking status: Never     Passive exposure: Never   • Smokeless tobacco: Never   Vaping Use   • Vaping Use: Never used   Substance and Sexual Activity   • Alcohol use: No   • Drug use: Never   • Sexual activity: Not Currently     Partners: Male     Birth control/protection: Tubal ligation       Review of Systems   Constitutional: Negative for fatigue, malaise/fatigue and weight gain.   HENT: Negative for nosebleeds.    Cardiovascular: Negative for chest pain, dyspnea on exertion, irregular heartbeat, leg swelling, near-syncope, orthopnea, palpitations, paroxysmal nocturnal dyspnea and syncope.   Respiratory: Negative for shortness of breath.    Hematologic/Lymphatic: Does not bruise/bleed easily.   Gastrointestinal: Negative for nausea.   Genitourinary: Negative for hematuria.   Neurological: Negative for dizziness and weakness.   All other systems reviewed and are negative.        ECG 12 Lead    Date/Time: 5/8/2023 1:17 PM  Performed by: Behzad Phipps APRN  Authorized by: Behzad Phipps APRN   Comparison: compared with previous ECG from 8/4/2023  Similar to previous ECG  Rhythm: sinus rhythm  Rate: normal  BPM: 75                 Objective:     Physical Exam  Vitals reviewed.   Constitutional:       General: She is not in acute distress.     Appearance: Normal appearance. She is well-developed. She is morbidly  "obese.   HENT:      Head: Normocephalic and atraumatic.      Nose: Nose normal.   Eyes:      Pupils: Pupils are equal, round, and reactive to light.   Neck:      Vascular: No carotid bruit.   Cardiovascular:      Rate and Rhythm: Normal rate and regular rhythm.      Heart sounds: Normal heart sounds. No murmur heard.  Pulmonary:      Effort: Pulmonary effort is normal. No respiratory distress.      Breath sounds: Normal breath sounds. No wheezing or rales.   Abdominal:      General: There is no distension.      Palpations: Abdomen is soft.   Musculoskeletal:         General: Normal range of motion.      Cervical back: Normal range of motion and neck supple.   Skin:     General: Skin is warm.      Findings: No erythema or rash.   Neurological:      Mental Status: She is alert and oriented to person, place, and time.   Psychiatric:         Speech: Speech normal.         Behavior: Behavior normal.         Thought Content: Thought content normal.         Judgment: Judgment normal.         /71 (BP Location: Left arm, Patient Position: Sitting, Cuff Size: Large Adult)   Pulse 75   Ht 172.7 cm (67.99\")   Wt 131 kg (289 lb)   SpO2 97%   BMI 43.95 kg/m²     Lab Review:       2/13/2023:      I have personally reviewed labs, and past office notes prior to patients visit  Assessment:          Diagnosis Plan   1. Chronic systolic congestive heart failure  Adult Transthoracic Echo Complete w/ Color, Spectral and Contrast if necessary per protocol      2. Essential hypertension        3. Dyslipidemia        4. Type 2 diabetes mellitus without complication, without long-term current use of insulin        5. Hypothyroidism, unspecified type        6. Other sleep apnea        7. Class 3 severe obesity due to excess calories with serious comorbidity and body mass index (BMI) of 40.0 to 44.9 in adult               Plan:       1. CHF: Hx of systolic CHF that normalized, LVEF 60% on echo from 11/11/2014. Compensated and stable " in office today. Continue carvedilol and Lasix as needed. Reviewed signs and symptoms of CHF and what to report to office with patient. Patient instructed to restrict sodium and record daily weight. Patient is to report weight gain of greater than 2 lbs overnight or 5 lbs in 1 week. Patient verbalized understanding of instructions and plan of care. Will recheck echo prior to next follow up.     2. Hypertension: well controlled in office. Continue current medications    3. Hyperlipidemia: Lipid panel from 2/13/2023 demonstrates well controlled cholesterol. Continue rosuvastatin.     4. Type 2 DM: Controlled with diet. Managed by PCP. A1C 6.0    5. Hypothyroidism: Followed by [facility]     6. Sleep apnea: Currently wearing CPAP at night    7. Obesity: Patient's Body mass index is 43.95 kg/m². BMI is above normal parameters. Recommendations include: exercise counseling and nutrition counseling.    I attest that all portions of this note reviewed and all information has been updated by myself to reflect the patient's current status.      I spent 38 minutes caring for Kacie on this date of service. This time includes time spent by me in the following activities:preparing for the visit, reviewing tests, obtaining and/or reviewing a separately obtained history, performing a medically appropriate examination and/or evaluation , counseling and educating the patient/family/caregiver and documenting information in the medical record    I spent 2 minutes on the separately reported service of EKG. This time is not included in the time used to support the E/M service also reported today.    Patient is to return in 6 months or sooner if needed

## 2023-08-02 ENCOUNTER — TRANSCRIBE ORDERS (OUTPATIENT)
Dept: ADMINISTRATIVE | Facility: HOSPITAL | Age: 63
End: 2023-08-02
Payer: COMMERCIAL

## 2023-08-02 DIAGNOSIS — Z12.31 ENCOUNTER FOR SCREENING MAMMOGRAM FOR MALIGNANT NEOPLASM OF BREAST: Primary | ICD-10-CM

## 2023-10-06 LAB
NCCN CRITERIA FLAG: NORMAL
TYRER CUZICK SCORE: 6.9

## 2023-10-16 ENCOUNTER — HOSPITAL ENCOUNTER (OUTPATIENT)
Dept: MAMMOGRAPHY | Facility: HOSPITAL | Age: 63
Discharge: HOME | End: 2023-10-16
Admitting: PHYSICIAN ASSISTANT
Payer: MEDICARE

## 2023-10-16 DIAGNOSIS — Z12.31 ENCOUNTER FOR SCREENING MAMMOGRAM FOR MALIGNANT NEOPLASM OF BREAST: ICD-10-CM

## 2023-10-16 PROCEDURE — 77067 SCR MAMMO BI INCL CAD: CPT

## 2023-10-16 PROCEDURE — 77063 BREAST TOMOSYNTHESIS BI: CPT

## 2024-02-28 ENCOUNTER — TRANSCRIBE ORDERS (OUTPATIENT)
Dept: ADMINISTRATIVE | Facility: HOSPITAL | Age: 64
End: 2024-02-28
Payer: MEDICARE

## 2024-02-28 DIAGNOSIS — N64.52 BLOODY DISCHARGE FROM LEFT NIPPLE: Primary | ICD-10-CM

## 2024-03-22 ENCOUNTER — HOSPITAL ENCOUNTER (OUTPATIENT)
Dept: ULTRASOUND IMAGING | Facility: HOSPITAL | Age: 64
Discharge: HOME | End: 2024-03-22
Payer: MEDICARE

## 2024-03-22 ENCOUNTER — HOSPITAL ENCOUNTER (OUTPATIENT)
Dept: MAMMOGRAPHY | Facility: HOSPITAL | Age: 64
Discharge: HOME | End: 2024-03-22
Payer: MEDICARE

## 2024-03-22 DIAGNOSIS — N64.52 BLOODY DISCHARGE FROM LEFT NIPPLE: ICD-10-CM

## 2024-03-22 PROCEDURE — 76642 ULTRASOUND BREAST LIMITED: CPT

## 2024-03-22 PROCEDURE — G0279 TOMOSYNTHESIS, MAMMO: HCPCS

## 2024-03-22 PROCEDURE — 77065 DX MAMMO INCL CAD UNI: CPT

## 2024-03-28 ENCOUNTER — HOSPITAL ENCOUNTER (OUTPATIENT)
Dept: MAMMOGRAPHY | Facility: HOSPITAL | Age: 64
Discharge: HOME | End: 2024-03-28
Payer: MEDICARE

## 2024-03-28 ENCOUNTER — HOSPITAL ENCOUNTER (OUTPATIENT)
Dept: ULTRASOUND IMAGING | Facility: HOSPITAL | Age: 64
Discharge: HOME | End: 2024-03-28
Payer: MEDICARE

## 2024-03-28 DIAGNOSIS — R92.8 ABNORMAL MAMMOGRAM: ICD-10-CM

## 2024-03-28 PROCEDURE — 88305 TISSUE EXAM BY PATHOLOGIST: CPT | Performed by: PHYSICIAN ASSISTANT

## 2024-03-28 PROCEDURE — A4648 IMPLANTABLE TISSUE MARKER: HCPCS

## 2024-03-28 RX ORDER — LIDOCAINE HYDROCHLORIDE 10 MG/ML
10 INJECTION, SOLUTION INFILTRATION; PERINEURAL ONCE
Status: DISPENSED | OUTPATIENT
Start: 2024-03-28

## 2024-03-28 RX ORDER — LIDOCAINE HYDROCHLORIDE AND EPINEPHRINE 10; 10 MG/ML; UG/ML
10 INJECTION, SOLUTION INFILTRATION; PERINEURAL ONCE
Status: DISPENSED | OUTPATIENT
Start: 2024-03-28

## 2024-03-29 LAB
LAB AP CASE REPORT: NORMAL
LAB AP CLINICAL INFORMATION: NORMAL
Lab: NORMAL
PATH REPORT.FINAL DX SPEC: NORMAL
PATH REPORT.GROSS SPEC: NORMAL

## 2024-04-03 ENCOUNTER — OFFICE VISIT (OUTPATIENT)
Dept: SURGERY | Facility: CLINIC | Age: 64
End: 2024-04-03
Payer: MEDICARE

## 2024-04-03 VITALS
BODY MASS INDEX: 44.41 KG/M2 | WEIGHT: 293 LBS | SYSTOLIC BLOOD PRESSURE: 124 MMHG | HEIGHT: 68 IN | HEART RATE: 81 BPM | DIASTOLIC BLOOD PRESSURE: 76 MMHG | OXYGEN SATURATION: 98 %

## 2024-04-03 DIAGNOSIS — N64.52 NIPPLE DISCHARGE: ICD-10-CM

## 2024-04-03 DIAGNOSIS — E66.01 CLASS 3 SEVERE OBESITY DUE TO EXCESS CALORIES WITH SERIOUS COMORBIDITY AND BODY MASS INDEX (BMI) OF 40.0 TO 44.9 IN ADULT: ICD-10-CM

## 2024-04-03 DIAGNOSIS — D24.2 INTRADUCTAL PAPILLOMA OF BREAST, LEFT: Primary | ICD-10-CM

## 2024-04-03 PROCEDURE — 3078F DIAST BP <80 MM HG: CPT

## 2024-04-03 PROCEDURE — 1159F MED LIST DOCD IN RCRD: CPT

## 2024-04-03 PROCEDURE — 99204 OFFICE O/P NEW MOD 45 MIN: CPT

## 2024-04-03 PROCEDURE — 1160F RVW MEDS BY RX/DR IN RCRD: CPT

## 2024-04-03 PROCEDURE — 3074F SYST BP LT 130 MM HG: CPT

## 2024-04-03 RX ORDER — HEPARIN SODIUM 5000 [USP'U]/ML
5000 INJECTION, SOLUTION INTRAVENOUS; SUBCUTANEOUS ONCE
OUTPATIENT
Start: 2024-04-03 | End: 2024-04-03

## 2024-04-03 NOTE — H&P (VIEW-ONLY)
"Office New Patient History and Physical:     Referring Provider: Melani Mccoy PA    Chief Complaint   Patient presents with    Mass     Evaluation of left breast        Subjective .     History of present illness:  Kacie Baron is a 64 y.o. female who presents with a biopsy-proven intraductal papilloma of the left breast..  She states that she had been experiencing bloody nipple discharge of the L breast for 1 month prior to the biopsy.  She states that this was coming from the \"crease\" present at her left nipple. She states that since the biopsy she has not experienced this.  She endorses that this is her first abnormal mammogram.  She denies any palpable masses or skin changes of either breast.  She also denies nipple discharge of the right breast.  Her family history includes both maternal and paternal grandmothers with history of breast cancer.     Review of Systems    Review of Systems - General ROS: negative  ENT ROS: negative  Respiratory ROS: no cough, shortness of breath, or wheezing  Cardiovascular ROS: no chest pain or dyspnea on exertion  Gastrointestinal ROS: no abdominal pain, change in bowel habits, or black or bloody stools  Genito-Urinary ROS: no dysuria, trouble voiding, or hematuria  Dermatological ROS: negative   Breast ROS: positive for - nipple discharge  Hematological and Lymphatic ROS: negative  Musculoskeletal ROS: negative   Neurological ROS: no TIA or stroke symptoms    Psychological ROS: negative  Endocrine ROS: negative    History  Past Medical History:   Diagnosis Date    Arthritis     CHF (congestive heart failure)     Depression     Diabetes mellitus     DM (diabetes mellitus screen)     GERD (gastroesophageal reflux disease)     HLD (hyperlipidemia)     HTN (hypertension)     Sleep apnea     Thyroid condition    ,   Past Surgical History:   Procedure Laterality Date    BACK SURGERY      2007    CARDIAC CATHETERIZATION      [place] "    COLONOSCOPY      2016    CYST REMOVAL      DILATATION AND CURETTAGE      TONSILLECTOMY AND ADENOIDECTOMY      TUBAL ABDOMINAL LIGATION     ,   Family History   Problem Relation Age of Onset    Heart disease Mother     Heart attack Mother     Heart disease Father     Heart attack Father     Breast cancer Maternal Grandmother     Breast cancer Paternal Grandmother    ,   Social History     Tobacco Use    Smoking status: Never     Passive exposure: Never    Smokeless tobacco: Never   Vaping Use    Vaping status: Never Used   Substance Use Topics    Alcohol use: No    Drug use: Never   , (Not in a hospital admission)   and Allergies:  Patient has no known allergies.    Current Outpatient Medications:     aspirin 81 MG EC tablet, Take 1 tablet by mouth Daily., Disp: , Rfl:     carvedilol (COREG) 6.25 MG tablet, Take 1 tablet by mouth 2 (Two) Times a Day With Meals., Disp: 60 tablet, Rfl: 11    Cholecalciferol (VITAMIN D3) 5000 UNITS tablet, Take  by mouth., Disp: , Rfl:     FLUoxetine (PROzac) 40 MG capsule, Take 1 capsule by mouth Daily., Disp: , Rfl:     furosemide (LASIX) 20 MG tablet, Take 1 tablet by mouth As Needed., Disp: , Rfl:     Jardiance 25 MG tablet tablet, Take 1 tablet by mouth Daily., Disp: , Rfl:     meclizine 25 MG chewable tablet chewable tablet, Chew 1 tablet 3 (Three) Times a Day As Needed., Disp: , Rfl:     omeprazole (priLOSEC) 40 MG capsule, Take 1 capsule by mouth Daily., Disp: , Rfl:     rosuvastatin (CRESTOR) 10 MG tablet, Take 1 tablet by mouth Daily., Disp: , Rfl:     Semaglutide, 1 MG/DOSE, (OZEMPIC) 2 MG/1.5ML solution pen-injector, Inject  under the skin into the appropriate area as directed 1 (One) Time Per Week., Disp: , Rfl:     THYROID PO, Take 150 mg by mouth Daily., Disp: , Rfl:     traZODone (DESYREL) 75 MG half tablet, Take 100 mg by mouth Every Night., Disp: , Rfl:     Current Facility-Administered Medications:     lidocaine (XYLOCAINE) 1 % injection 10 mL, 10 mL, Subcutaneous,  "Once, Kelly Myers MD    lidocaine 1% - EPINEPHrine 1:135297 (XYLOCAINE W/EPI) 1 %-1:054209 injection 10 mL, 10 mL, Injection, Once, Kelly Myers MD    Objective     Vital Signs   /76   Pulse 81   Ht 172.7 cm (68\")   Wt 133 kg (294 lb)   SpO2 98%   BMI 44.70 kg/m²      Physical Exam:  General appearance - alert, well appearing, and in no distress and oriented to person, place, and time  Mental status - alert, oriented to person, place, and time, normal mood, behavior, speech, dress, motor activity, and thought processes  Eyes - pupils equal and reactive, extraocular eye movements intact, sclera anicteric  Neck - supple, no significant adenopathy  Chest - no tachypnea, retractions or cyanosis  Heart - normal rate and regular rhythm  Breasts - breasts appear normal, no suspicious masses, no skin or nipple changes or axillary nodes; upon palpation of the left breast, patient was extremely tender due to biopsy and I was unable to express any discharge from the left nipple  Neurological - alert, oriented, normal speech, no focal findings or movement disorder noted  Skin - normal coloration and turgor, no rashes, no suspicious skin lesions noted    Results Review:  Result Review :            Assessment & Plan       Diagnoses and all orders for this visit:    1. Intraductal papilloma of breast, left (Primary)    2. Class 3 severe obesity due to excess calories with serious comorbidity and body mass index (BMI) of 40.0 to 44.9 in adult     Ms. Baron is a 64-year-old female who presents to the clinic with biopsy-proven intraductal papilloma of the left breast.  After communication with Dr. Myers, the radiologist, imaging and pathology are concordant for this.  However due to \"scant\" specimen and bloody nipple discharge, it has been recommended that the patient undergo surgical excision.  I have placed the order for this including prework of a CBC, CMP, CXR, and EKG.  I explained to the patient that " we will call her with surgery scheduling details once the date and time is confirmed with Dr. Rollins.  She voiced understanding of this and is agreeable to the plan.  She will call for an appointment sooner if she has any problems or concerns.    Follow up:     Return if symptoms worsen or fail to improve.        Venecia Houston PA-C  04/03/24  17:00 CDT

## 2024-04-03 NOTE — PROGRESS NOTES
"Office New Patient History and Physical:     Referring Provider: Melani Mccoy PA    Chief Complaint   Patient presents with    Mass     Evaluation of left breast        Subjective .     History of present illness:  Kacie Baron is a 64 y.o. female who presents with a biopsy-proven intraductal papilloma of the left breast..  She states that she had been experiencing bloody nipple discharge of the L breast for 1 month prior to the biopsy.  She states that this was coming from the \"crease\" present at her left nipple. She states that since the biopsy she has not experienced this.  She endorses that this is her first abnormal mammogram.  She denies any palpable masses or skin changes of either breast.  She also denies nipple discharge of the right breast.  Her family history includes both maternal and paternal grandmothers with history of breast cancer.     Review of Systems    Review of Systems - General ROS: negative  ENT ROS: negative  Respiratory ROS: no cough, shortness of breath, or wheezing  Cardiovascular ROS: no chest pain or dyspnea on exertion  Gastrointestinal ROS: no abdominal pain, change in bowel habits, or black or bloody stools  Genito-Urinary ROS: no dysuria, trouble voiding, or hematuria  Dermatological ROS: negative   Breast ROS: positive for - nipple discharge  Hematological and Lymphatic ROS: negative  Musculoskeletal ROS: negative   Neurological ROS: no TIA or stroke symptoms    Psychological ROS: negative  Endocrine ROS: negative    History  Past Medical History:   Diagnosis Date    Arthritis     CHF (congestive heart failure)     Depression     Diabetes mellitus     DM (diabetes mellitus screen)     GERD (gastroesophageal reflux disease)     HLD (hyperlipidemia)     HTN (hypertension)     Sleep apnea     Thyroid condition    ,   Past Surgical History:   Procedure Laterality Date    BACK SURGERY      2007    CARDIAC CATHETERIZATION      CHOLECYSTECTOMY  09/23/2021    [facility]/ DR PEREZ "    COLONOSCOPY      2016    CYST REMOVAL      DILATATION AND CURETTAGE      TONSILLECTOMY AND ADENOIDECTOMY      TUBAL ABDOMINAL LIGATION     ,   Family History   Problem Relation Age of Onset    Heart disease Mother     Heart attack Mother     Heart disease Father     Heart attack Father     Breast cancer Maternal Grandmother     Breast cancer Paternal Grandmother    ,   Social History     Tobacco Use    Smoking status: Never     Passive exposure: Never    Smokeless tobacco: Never   Vaping Use    Vaping status: Never Used   Substance Use Topics    Alcohol use: No    Drug use: Never   , (Not in a hospital admission)   and Allergies:  Patient has no known allergies.    Current Outpatient Medications:     aspirin 81 MG EC tablet, Take 1 tablet by mouth Daily., Disp: , Rfl:     carvedilol (COREG) 6.25 MG tablet, Take 1 tablet by mouth 2 (Two) Times a Day With Meals., Disp: 60 tablet, Rfl: 11    Cholecalciferol (VITAMIN D3) 5000 UNITS tablet, Take  by mouth., Disp: , Rfl:     FLUoxetine (PROzac) 40 MG capsule, Take 1 capsule by mouth Daily., Disp: , Rfl:     furosemide (LASIX) 20 MG tablet, Take 1 tablet by mouth As Needed., Disp: , Rfl:     Jardiance 25 MG tablet tablet, Take 1 tablet by mouth Daily., Disp: , Rfl:     meclizine 25 MG chewable tablet chewable tablet, Chew 1 tablet 3 (Three) Times a Day As Needed., Disp: , Rfl:     omeprazole (priLOSEC) 40 MG capsule, Take 1 capsule by mouth Daily., Disp: , Rfl:     rosuvastatin (CRESTOR) 10 MG tablet, Take 1 tablet by mouth Daily., Disp: , Rfl:     Semaglutide, 1 MG/DOSE, (OZEMPIC) 2 MG/1.5ML solution pen-injector, Inject  under the skin into the appropriate area as directed 1 (One) Time Per Week., Disp: , Rfl:     THYROID PO, Take 150 mg by mouth Daily., Disp: , Rfl:     traZODone (DESYREL) 75 MG half tablet, Take 100 mg by mouth Every Night., Disp: , Rfl:     Current Facility-Administered Medications:     lidocaine (XYLOCAINE) 1 % injection 10 mL, 10 mL, Subcutaneous,  "Once, Kelly Myers MD    lidocaine 1% - EPINEPHrine 1:[id number] (XYLOCAINE W/EPI) 1 %-1:035049 injection 10 mL, 10 mL, Injection, Once, Kelly Myers MD    Objective     Vital Signs   /76   Pulse 81   Ht 172.7 cm (68\")   Wt 133 kg (294 lb)   SpO2 98%   BMI 44.70 kg/m²      Physical Exam:  General appearance - alert, well appearing, and in no distress and oriented to person, place, and time  Mental status - alert, oriented to person, place, and time, normal mood, behavior, speech, dress, motor activity, and thought processes  Eyes - pupils equal and reactive, extraocular eye movements intact, sclera anicteric  Neck - supple, no significant adenopathy  Chest - no tachypnea, retractions or cyanosis  Heart - normal rate and regular rhythm  Breasts - breasts appear normal, no suspicious masses, no skin or nipple changes or axillary nodes; upon palpation of the left breast, patient was extremely tender due to biopsy and I was unable to express any discharge from the left nipple  Neurological - alert, oriented, normal speech, no focal findings or movement disorder noted  Skin - normal coloration and turgor, no rashes, no suspicious skin lesions noted    Results Review:  Result Review :            Assessment & Plan       Diagnoses and all orders for this visit:    1. Intraductal papilloma of breast, left (Primary)    2. Class 3 severe obesity due to excess calories with serious comorbidity and body mass index (BMI) of 40.0 to 44.9 in adult     Ms. Baron is a 64-year-old female who presents to the clinic with biopsy-proven intraductal papilloma of the left breast.  After communication with Dr. Myers, the radiologist, imaging and pathology are concordant for this.  However due to \"scant\" specimen and bloody nipple discharge, it has been recommended that the patient undergo surgical excision.  I have placed the order for this including prework of a CBC, CMP, CXR, and EKG.  I explained to the patient that " we will call her with surgery scheduling details once the date and time is confirmed with Dr. Rollins.  She voiced understanding of this and is agreeable to the plan.  She will call for an appointment sooner if she has any problems or concerns.    Follow up:     Return if symptoms worsen or fail to improve.        Venecia Houston PA-C  04/03/24  17:00 CDT

## 2024-04-10 ENCOUNTER — PREP FOR SURGERY (OUTPATIENT)
Dept: OTHER | Facility: HOSPITAL | Age: 64
End: 2024-04-10
Payer: MEDICARE

## 2024-04-10 DIAGNOSIS — D24.2 INTRADUCTAL PAPILLOMA OF BREAST, LEFT: Primary | ICD-10-CM

## 2024-04-10 RX ORDER — HEPARIN SODIUM 5000 [USP'U]/ML
5000 INJECTION, SOLUTION INTRAVENOUS; SUBCUTANEOUS EVERY 8 HOURS SCHEDULED
OUTPATIENT
Start: 2024-04-10

## 2024-04-10 RX ORDER — HEPARIN SODIUM 5000 [USP'U]/ML
5000 INJECTION, SOLUTION INTRAVENOUS; SUBCUTANEOUS ONCE
OUTPATIENT
Start: 2024-04-10 | End: 2024-04-10

## 2024-04-26 ENCOUNTER — HOSPITAL ENCOUNTER (OUTPATIENT)
Dept: ULTRASOUND IMAGING | Facility: HOSPITAL | Age: 64
Discharge: HOME | End: 2024-04-26
Payer: MEDICARE

## 2024-04-26 ENCOUNTER — HOSPITAL ENCOUNTER (OUTPATIENT)
Dept: MAMMOGRAPHY | Facility: HOSPITAL | Age: 64
Discharge: HOME | End: 2024-04-26
Payer: MEDICARE

## 2024-04-26 ENCOUNTER — PRE-ADMISSION TESTING (OUTPATIENT)
Dept: PREADMISSION TESTING | Facility: HOSPITAL | Age: 64
End: 2024-04-26
Payer: MEDICARE

## 2024-04-26 ENCOUNTER — HOSPITAL ENCOUNTER (OUTPATIENT)
Dept: GENERAL RADIOLOGY | Facility: HOSPITAL | Age: 64
Discharge: HOME | End: 2024-04-26
Payer: MEDICARE

## 2024-04-26 VITALS
OXYGEN SATURATION: 94 % | HEIGHT: 69 IN | RESPIRATION RATE: 16 BRPM | WEIGHT: 291.67 LBS | SYSTOLIC BLOOD PRESSURE: 153 MMHG | DIASTOLIC BLOOD PRESSURE: 75 MMHG | HEART RATE: 73 BPM | BODY MASS INDEX: 43.2 KG/M2

## 2024-04-26 DIAGNOSIS — D24.2 INTRADUCTAL PAPILLOMA OF BREAST, LEFT: ICD-10-CM

## 2024-04-26 LAB
ALBUMIN SERPL-MCNC: 4 G/DL (ref 3.5–5.2)
ALBUMIN/GLOB SERPL: 1.5 G/DL
ALP SERPL-CCNC: 109 U/L (ref 39–117)
ALT SERPL W P-5'-P-CCNC: 17 U/L (ref 1–33)
ANION GAP SERPL CALCULATED.3IONS-SCNC: 10 MMOL/L (ref 5–15)
AST SERPL-CCNC: 16 U/L (ref 1–32)
BASOPHILS # BLD AUTO: 0.06 10*3/MM3 (ref 0–0.2)
BASOPHILS NFR BLD AUTO: 0.7 % (ref 0–1.5)
BILIRUB SERPL-MCNC: 0.2 MG/DL (ref 0–1.2)
BUN SERPL-MCNC: 14 MG/DL (ref 8–23)
BUN/CREAT SERPL: 25 (ref 7–25)
CALCIUM SPEC-SCNC: 9.5 MG/DL (ref 8.6–10.5)
CHLORIDE SERPL-SCNC: 106 MMOL/L (ref 98–107)
CO2 SERPL-SCNC: 25 MMOL/L (ref 22–29)
CREAT SERPL-MCNC: 0.56 MG/DL (ref 0.57–1)
DEPRECATED RDW RBC AUTO: 45.2 FL (ref 37–54)
EGFRCR SERPLBLD CKD-EPI 2021: 100.8 ML/MIN/1.73
EOSINOPHIL # BLD AUTO: 0.32 10*3/MM3 (ref 0–0.4)
EOSINOPHIL NFR BLD AUTO: 3.6 % (ref 0.3–6.2)
ERYTHROCYTE [DISTWIDTH] IN BLOOD BY AUTOMATED COUNT: 14.1 % (ref 12.3–15.4)
GLOBULIN UR ELPH-MCNC: 2.7 GM/DL
GLUCOSE SERPL-MCNC: 114 MG/DL (ref 65–99)
HCT VFR BLD AUTO: 45.1 % (ref 34–46.6)
HGB BLD-MCNC: 14.2 G/DL (ref 12–15.9)
IMM GRANULOCYTES # BLD AUTO: 0.04 10*3/MM3 (ref 0–0.05)
IMM GRANULOCYTES NFR BLD AUTO: 0.4 % (ref 0–0.5)
LYMPHOCYTES # BLD AUTO: 2.51 10*3/MM3 (ref 0.7–3.1)
LYMPHOCYTES NFR BLD AUTO: 27.9 % (ref 19.6–45.3)
MCH RBC QN AUTO: 28 PG (ref 26.6–33)
MCHC RBC AUTO-ENTMCNC: 31.5 G/DL (ref 31.5–35.7)
MCV RBC AUTO: 89 FL (ref 79–97)
MONOCYTES # BLD AUTO: 0.7 10*3/MM3 (ref 0.1–0.9)
MONOCYTES NFR BLD AUTO: 7.8 % (ref 5–12)
NEUTROPHILS NFR BLD AUTO: 5.38 10*3/MM3 (ref 1.7–7)
NEUTROPHILS NFR BLD AUTO: 59.6 % (ref 42.7–76)
NRBC BLD AUTO-RTO: 0 /100 WBC (ref 0–0.2)
PLATELET # BLD AUTO: 216 10*3/MM3 (ref 140–450)
PMV BLD AUTO: 10.6 FL (ref 6–12)
POTASSIUM SERPL-SCNC: 4.1 MMOL/L (ref 3.5–5.2)
PROT SERPL-MCNC: 6.7 G/DL (ref 6–8.5)
QT INTERVAL: 412 MS
QTC INTERVAL: 435 MS
RBC # BLD AUTO: 5.07 10*6/MM3 (ref 3.77–5.28)
SODIUM SERPL-SCNC: 141 MMOL/L (ref 136–145)
WBC NRBC COR # BLD AUTO: 9.01 10*3/MM3 (ref 3.4–10.8)

## 2024-04-26 PROCEDURE — 87081 CULTURE SCREEN ONLY: CPT

## 2024-04-26 PROCEDURE — A4648 IMPLANTABLE TISSUE MARKER: HCPCS

## 2024-04-26 PROCEDURE — 36415 COLL VENOUS BLD VENIPUNCTURE: CPT

## 2024-04-26 PROCEDURE — 71045 X-RAY EXAM CHEST 1 VIEW: CPT

## 2024-04-26 PROCEDURE — 93005 ELECTROCARDIOGRAM TRACING: CPT

## 2024-04-26 PROCEDURE — 85025 COMPLETE CBC W/AUTO DIFF WBC: CPT

## 2024-04-26 PROCEDURE — 80053 COMPREHEN METABOLIC PANEL: CPT

## 2024-04-26 RX ORDER — LIDOCAINE HYDROCHLORIDE 10 MG/ML
10 INJECTION, SOLUTION INFILTRATION; PERINEURAL ONCE
Status: ACTIVE | OUTPATIENT
Start: 2024-04-26

## 2024-04-26 RX ORDER — LEVOTHYROXINE SODIUM 0.15 MG/1
150 TABLET ORAL DAILY
COMMUNITY

## 2024-04-26 NOTE — DISCHARGE INSTRUCTIONS
Preparing for Surgery  Follow these instructions before the procedure:  Several days or weeks before your procedure  Medication(s) you need to stop   ____OZEMPIC__  1 week prior to surgery       Ask your health care provider about:  Changing or stopping your regular medicines. This is especially important if you are taking diabetes medicines or blood thinners.  Taking medicines such as aspirin and ibuprofen. These medicines can thin your blood. Do not take these medicines unless your health care provider tells you to take them.  Taking over-the-counter medicines, vitamins, herbs, and supplements.    Contact your surgeon if you:  Develop a fever of more than 100.4°F (38°C) or other feelings of illness during the 48 hours before your surgery.  Have symptoms that get worse.  Have questions or concerns about your surgery.  If you are going home the same day of your surgery you will need to arrange for a responsible adult, age 18 years old or older, to drive you home from the hospital and stay with you for 24 hours. Verification of the  will be made prior to any procedure requiring sedation. You may not go home in a taxi or any form of public transportation by yourself.     Day before your procedure    24 hours before your procedure DO NOT drink alcoholic beverages or smoke.  24 hours before your procedure STOP taking Erectile Dysfunction medication (i.e.,Cialis, Viagra)   You may be asked to shower with a germ-killing soap.  Day of your procedure   You may take the following medication(s) the morning of surgery with a sip of water:  OMEPRAZOLE, COREG      8 hours before your procedure STOP all food, any dairy products, and full liquids. This includes hard candy, chewing gum or mints. This is extremely important to prevent serious complications.     Up to 2 hours before your scheduled arrival time, you may have clear liquids no cream, powder, or pulp of any kind. Safe options are water, black coffee, plain tea,  soda, Gatorade/Powerade, clear broth, apple juice.    2 hours before your scheduled arrival time, STOP drinking clear liquids.    You may need to take another shower with a germ-killing soap before you leave home in the morning. Do not use perfumes, colognes, or body lotions.  Wear comfortable loose-fitting clothing.  Remove all jewelry including body piercing and rings, dark colored nail polish, and make up prior to arrival at the hospital. Leave all valuables at home.   Bring your hearing aids if you rely on them.  Do not wear contact lenses. If you wear eyeglasses remember to bring a case to store them in while you are in surgery.  Do not use denture adhesives since you will be asked to remove them during your surgery.    You do not need to bring your home medications into the hospital.   Bring your sleep apnea device with you on the day of your surgery (if this applies to you).  If you wear portable oxygen, bring it with you.   If you are staying overnight, you may bring a bag of items you may need such as slippers, robe and a change of clothes for your discharge. You may want to leave these items in the car until you are ready for them since your family will take your belongings when you leave the pre-operative area.  Arrive at the hospital as scheduled by the office. You will be asked to arrive 2 hours prior to your surgery time in order to prepare for your procedure.  When you arrive at the hospital  Go to the registration desk located at the main entrance of the hospital.  After registration is completed, you will be given a beeper and a sticker sheet. Take the stickers to Outpatient Surgery and place in the tray at the end of the desk to notify the staff that you have arrived and registered.   Return to the lobby to wait. You are not always called back according to the time of arrival but rather the time your doctor will be ready.  When your beeper lights up and vibrates proceed through the double doors,  under the stairs, and a member of the Outpatient Surgery staff will escort you to your preoperative room.   How to Use Chlorhexidine Before Surgery  Chlorhexidine gluconate (CHG) is a germ-killing (antiseptic) solution that is used to clean the skin. It can get rid of the bacteria that normally live on the skin and can keep them away for about 24 hours. To clean your skin with CHG, you may be given:  A CHG solution to use in the shower or as part of a sponge bath.  A prepackaged cloth that contains CHG.  Cleaning your skin with CHG may help lower the risk for infection:  While you are staying in the intensive care unit of the hospital.  If you have a vascular access, such as a central line, to provide short-term or long-term access to your veins.  If you have a catheter to drain urine from your bladder.  If you are on a ventilator. A ventilator is a machine that helps you breathe by moving air in and out of your lungs.  After surgery.  What are the risks?  Risks of using CHG include:  A skin reaction.  Hearing loss, if CHG gets in your ears and you have a perforated eardrum.  Eye injury, if CHG gets in your eyes and is not rinsed out.  The CHG product catching fire.  Make sure that you avoid smoking and flames after applying CHG to your skin.  Do not use CHG:  If you have a chlorhexidine allergy or have previously reacted to chlorhexidine.  On babies younger than 2 months of age.  How to use CHG solution  Use CHG only as told by your health care provider, and follow the instructions on the label.  Use the full amount of CHG as directed. Usually, this is one bottle.  During a shower    Follow these steps when using CHG solution during a shower (unless your health care provider gives you different instructions):  Start the shower.  Use your normal soap and shampoo to wash your face and hair.  Turn off the shower or move out of the shower stream.  Pour the CHG onto a clean washcloth. Do not use any type of brush or  rough-edged sponge.  Starting at your neck, lather your body down to your toes. Make sure you follow these instructions:  If you will be having surgery, pay special attention to the part of your body where you will be having surgery. Scrub this area for at least 1 minute.  Do not use CHG on your head or face. If the solution gets into your ears or eyes, rinse them well with water.  Avoid your genital area.  Avoid any areas of skin that have broken skin, cuts, or scrapes.  Scrub your back and under your arms. Make sure to wash skin folds.  Let the lather sit on your skin for 1-2 minutes or as long as told by your health care provider.  Thoroughly rinse your entire body in the shower. Make sure that all body creases and crevices are rinsed well.  Dry off with a clean towel. Do not put any substances on your body afterward--such as powder, lotion, or perfume--unless you are told to do so by your health care provider. Only use lotions that are recommended by the .  Put on clean clothes or pajamas.  If it is the night before your surgery, sleep in clean sheets.     During a sponge bath  Follow these steps when using CHG solution during a sponge bath (unless your health care provider gives you different instructions):  Use your normal soap and shampoo to wash your face and hair.  Pour the CHG onto a clean washcloth.  Starting at your neck, lather your body down to your toes. Make sure you follow these instructions:  If you will be having surgery, pay special attention to the part of your body where you will be having surgery. Scrub this area for at least 1 minute.  Do not use CHG on your head or face. If the solution gets into your ears or eyes, rinse them well with water.  Avoid your genital area.  Avoid any areas of skin that have broken skin, cuts, or scrapes.  Scrub your back and under your arms. Make sure to wash skin folds.  Let the lather sit on your skin for 1-2 minutes or as long as told by your  health care provider.  Using a different clean, wet washcloth, thoroughly rinse your entire body. Make sure that all body creases and crevices are rinsed well.  Dry off with a clean towel. Do not put any substances on your body afterward--such as powder, lotion, or perfume--unless you are told to do so by your health care provider. Only use lotions that are recommended by the .  Put on clean clothes or pajamas.  If it is the night before your surgery, sleep in clean sheets.  How to use CHG prepackaged cloths  Only use CHG cloths as told by your health care provider, and follow the instructions on the label.  Use the CHG cloth on clean, dry skin.  Do not use the CHG cloth on your head or face unless your health care provider tells you to.  When washing with the CHG cloth:  Avoid your genital area.  Avoid any areas of skin that have broken skin, cuts, or scrapes.  Before surgery    Follow these steps when using a CHG cloth to clean before surgery (unless your health care provider gives you different instructions):  Using the CHG cloth, vigorously scrub the part of your body where you will be having surgery. Scrub using a back-and-forth motion for 3 minutes. The area on your body should be completely wet with CHG when you are done scrubbing.  Do not rinse. Discard the cloth and let the area air-dry. Do not put any substances on the area afterward, such as powder, lotion, or perfume.  Put on clean clothes or pajamas.  If it is the night before your surgery, sleep in clean sheets.     For general bathing  Follow these steps when using CHG cloths for general bathing (unless your health care provider gives you different instructions).  Use a separate CHG cloth for each area of your body. Make sure you wash between any folds of skin and between your fingers and toes. Wash your body in the following order, switching to a new cloth after each step:  The front of your neck, shoulders, and chest.  Both of your arms,  under your arms, and your hands.  Your stomach and groin area, avoiding the genitals.  Your right leg and foot.  Your left leg and foot.  The back of your neck, your back, and your buttocks.  Do not rinse. Discard the cloth and let the area air-dry. Do not put any substances on your body afterward--such as powder, lotion, or perfume--unless you are told to do so by your health care provider. Only use lotions that are recommended by the .  Put on clean clothes or pajamas.  Contact a health care provider if:  Your skin gets irritated after scrubbing.  You have questions about using your solution or cloth.  You swallow any chlorhexidine. Call your local poison control center (1-896.581.3123 in the [place]  Get help right away if:  Your eyes itch badly, or they become very red or swollen.  Your skin itches badly and is red or swollen.  Your hearing changes.  You have trouble seeing.  You have swelling or tingling in your mouth or throat.  You have trouble breathing.  These symptoms may represent a serious problem that is an emergency. Do not wait to see if the symptoms will go away. Get medical help right away. Call your local emergency services (305 in the [place] Do not drive yourself to the hospital.  Summary  Chlorhexidine gluconate (CHG) is a germ-killing (antiseptic) solution that is used to clean the skin. Cleaning your skin with CHG may help to lower your risk for infection.  You may be given CHG to use for bathing. It may be in a bottle or in a prepackaged cloth to use on your skin. Carefully follow your health care provider's instructions and the instructions on the product label.  Do not use CHG if you have a chlorhexidine allergy.  Contact your health care provider if your skin gets irritated after scrubbing.  This information is not intended to replace advice given to you by your health care provider. Make sure you discuss any questions you have with your health care provider.  Document Revised:  04/17/2023 Document Reviewed: 02/28/2022  Elsevier Patient Education © 2023 [employer]

## 2024-04-27 LAB — MRSA SPEC QL CULT: NORMAL

## 2024-04-30 LAB
QT INTERVAL: 412 MS
QTC INTERVAL: 435 MS

## 2024-05-01 ENCOUNTER — HOSPITAL ENCOUNTER (OUTPATIENT)
Dept: CARDIOLOGY | Facility: HOSPITAL | Age: 64
Discharge: HOME | End: 2024-05-01
Admitting: NURSE PRACTITIONER
Payer: MEDICARE

## 2024-05-01 DIAGNOSIS — I50.22 CHRONIC SYSTOLIC CONGESTIVE HEART FAILURE: ICD-10-CM

## 2024-05-01 PROCEDURE — 93306 TTE W/DOPPLER COMPLETE: CPT

## 2024-05-03 ENCOUNTER — HOSPITAL ENCOUNTER (OUTPATIENT)
Dept: GENERAL RADIOLOGY | Facility: HOSPITAL | Age: 64
Discharge: HOME | End: 2024-05-03
Payer: MEDICARE

## 2024-05-03 ENCOUNTER — HOSPITAL ENCOUNTER (OUTPATIENT)
Facility: HOSPITAL | Age: 64
Setting detail: HOSPITAL OUTPATIENT SURGERY
Discharge: HOME | End: 2024-05-03
Attending: STUDENT IN AN ORGANIZED HEALTH CARE EDUCATION/TRAINING PROGRAM | Admitting: STUDENT IN AN ORGANIZED HEALTH CARE EDUCATION/TRAINING PROGRAM
Payer: MEDICARE

## 2024-05-03 ENCOUNTER — ANESTHESIA EVENT (OUTPATIENT)
Dept: PERIOP | Facility: HOSPITAL | Age: 64
End: 2024-05-03
Payer: MEDICARE

## 2024-05-03 ENCOUNTER — ANESTHESIA (OUTPATIENT)
Dept: PERIOP | Facility: HOSPITAL | Age: 64
End: 2024-05-03
Payer: MEDICARE

## 2024-05-03 VITALS
RESPIRATION RATE: 18 BRPM | SYSTOLIC BLOOD PRESSURE: 101 MMHG | DIASTOLIC BLOOD PRESSURE: 73 MMHG | OXYGEN SATURATION: 95 % | TEMPERATURE: 97.6 F | HEART RATE: 66 BPM

## 2024-05-03 DIAGNOSIS — D24.2 INTRADUCTAL PAPILLOMA OF BREAST, LEFT: ICD-10-CM

## 2024-05-03 LAB
BH CV ECHO MEAS - AO MAX PG: 10 MMHG
BH CV ECHO MEAS - AO MEAN PG: 5 MMHG
BH CV ECHO MEAS - AO ROOT DIAM: 3.2 CM
BH CV ECHO MEAS - AO V2 MAX: 158 CM/SEC
BH CV ECHO MEAS - AO V2 VTI: 34.6 CM
BH CV ECHO MEAS - AVA(I,D): 2.22 CM2
BH CV ECHO MEAS - EDV(CUBED): 140.6 ML
BH CV ECHO MEAS - EDV(MOD-SP2): 78.2 ML
BH CV ECHO MEAS - EDV(MOD-SP4): 104 ML
BH CV ECHO MEAS - EF(MOD-BP): 62.5 %
BH CV ECHO MEAS - EF(MOD-SP2): 67.9 %
BH CV ECHO MEAS - EF(MOD-SP4): 58 %
BH CV ECHO MEAS - ESV(CUBED): 19.7 ML
BH CV ECHO MEAS - ESV(MOD-SP2): 25.1 ML
BH CV ECHO MEAS - ESV(MOD-SP4): 43.7 ML
BH CV ECHO MEAS - FS: 48.1 %
BH CV ECHO MEAS - IVS/LVPW: 1 CM
BH CV ECHO MEAS - IVSD: 1.1 CM
BH CV ECHO MEAS - LA DIMENSION: 4.1 CM
BH CV ECHO MEAS - LAT PEAK E' VEL: 6.7 CM/SEC
BH CV ECHO MEAS - LV DIASTOLIC VOL/BSA (35-75): 43.4 CM2
BH CV ECHO MEAS - LV MASS(C)D: 220.8 GRAMS
BH CV ECHO MEAS - LV MAX PG: 4 MMHG
BH CV ECHO MEAS - LV MEAN PG: 2 MMHG
BH CV ECHO MEAS - LV SYSTOLIC VOL/BSA (12-30): 18.2 CM2
BH CV ECHO MEAS - LV V1 MAX: 100.4 CM/SEC
BH CV ECHO MEAS - LV V1 VTI: 24.4 CM
BH CV ECHO MEAS - LVIDD: 5.2 CM
BH CV ECHO MEAS - LVIDS: 2.7 CM
BH CV ECHO MEAS - LVOT AREA: 3.1 CM2
BH CV ECHO MEAS - LVOT DIAM: 2 CM
BH CV ECHO MEAS - LVPWD: 1.1 CM
BH CV ECHO MEAS - MED PEAK E' VEL: 5.8 CM/SEC
BH CV ECHO MEAS - MV A MAX VEL: 72 CM/SEC
BH CV ECHO MEAS - MV DEC TIME: 0.23 SEC
BH CV ECHO MEAS - MV E MAX VEL: 78 CM/SEC
BH CV ECHO MEAS - MV E/A: 1.08
BH CV ECHO MEAS - SV(LVOT): 76.7 ML
BH CV ECHO MEAS - SV(MOD-SP2): 53.1 ML
BH CV ECHO MEAS - SV(MOD-SP4): 60.3 ML
BH CV ECHO MEAS - SVI(LVOT): 32 ML/M2
BH CV ECHO MEAS - SVI(MOD-SP2): 22.2 ML/M2
BH CV ECHO MEAS - SVI(MOD-SP4): 25.2 ML/M2
BH CV ECHO MEASUREMENTS AVERAGE E/E' RATIO: 12.48
BH CV XLRA - RV BASE: 4.2 CM
BH CV XLRA - RV LENGTH: 5.9 CM
BH CV XLRA - RV MID: 3.9 CM
GLUCOSE BLDC GLUCOMTR-MCNC: 118 MG/DL (ref 70–130)
GLUCOSE BLDC GLUCOMTR-MCNC: 130 MG/DL (ref 70–130)
LEFT ATRIUM VOLUME INDEX: 36.8 ML/M2
LEFT ATRIUM VOLUME: 88.2 ML

## 2024-05-03 PROCEDURE — 76098 X-RAY EXAM SURGICAL SPECIMEN: CPT

## 2024-05-03 PROCEDURE — 25010000002 CEFAZOLIN PER 500 MG: Performed by: STUDENT IN AN ORGANIZED HEALTH CARE EDUCATION/TRAINING PROGRAM

## 2024-05-03 PROCEDURE — 25010000002 DEXAMETHASONE PER 1 MG

## 2024-05-03 PROCEDURE — 25010000002 LIDOCAINE 1 % SOLUTION 20 ML VIAL: Performed by: STUDENT IN AN ORGANIZED HEALTH CARE EDUCATION/TRAINING PROGRAM

## 2024-05-03 PROCEDURE — 25010000002 DEXAMETHASONE PER 1 MG: Performed by: ANESTHESIOLOGY

## 2024-05-03 PROCEDURE — 19125 EXCISION BREAST LESION: CPT | Performed by: STUDENT IN AN ORGANIZED HEALTH CARE EDUCATION/TRAINING PROGRAM

## 2024-05-03 PROCEDURE — 25810000003 LACTATED RINGERS PER 1000 ML: Performed by: STUDENT IN AN ORGANIZED HEALTH CARE EDUCATION/TRAINING PROGRAM

## 2024-05-03 PROCEDURE — 25010000002 BUPIVACAINE (PF) 0.25 % SOLUTION 10 ML VIAL: Performed by: STUDENT IN AN ORGANIZED HEALTH CARE EDUCATION/TRAINING PROGRAM

## 2024-05-03 PROCEDURE — 25010000002 PROPOFOL 10 MG/ML EMULSION

## 2024-05-03 PROCEDURE — 25010000002 HEPARIN (PORCINE) PER 1000 UNITS: Performed by: STUDENT IN AN ORGANIZED HEALTH CARE EDUCATION/TRAINING PROGRAM

## 2024-05-03 PROCEDURE — 82948 REAGENT STRIP/BLOOD GLUCOSE: CPT

## 2024-05-03 PROCEDURE — 88307 TISSUE EXAM BY PATHOLOGIST: CPT | Performed by: STUDENT IN AN ORGANIZED HEALTH CARE EDUCATION/TRAINING PROGRAM

## 2024-05-03 PROCEDURE — 25010000002 FENTANYL CITRATE (PF) 100 MCG/2ML SOLUTION

## 2024-05-03 DEVICE — LIGACLIP MCA MULTIPLE CLIP APPLIERS, 30 MEDIUM CLIPS
Type: IMPLANTABLE DEVICE | Site: BREAST | Status: FUNCTIONAL
Brand: LIGACLIP

## 2024-05-03 RX ORDER — IBUPROFEN 200 MG
600 TABLET ORAL EVERY 8 HOURS
Start: 2024-05-03 | End: 2025-05-03

## 2024-05-03 RX ORDER — FENTANYL CITRATE 50 UG/ML
50 INJECTION, SOLUTION INTRAMUSCULAR; INTRAVENOUS
Status: DISCONTINUED | OUTPATIENT
Start: 2024-05-03 | End: 2024-05-03 | Stop reason: HOSPADM

## 2024-05-03 RX ORDER — DROPERIDOL 2.5 MG/ML
0.62 INJECTION, SOLUTION INTRAMUSCULAR; INTRAVENOUS ONCE AS NEEDED
Status: DISCONTINUED | OUTPATIENT
Start: 2024-05-03 | End: 2024-05-03 | Stop reason: HOSPADM

## 2024-05-03 RX ORDER — HYDROCODONE BITARTRATE AND ACETAMINOPHEN 10; 325 MG/1; MG/1
1 TABLET ORAL EVERY 4 HOURS PRN
Status: DISCONTINUED | OUTPATIENT
Start: 2024-05-03 | End: 2024-05-03 | Stop reason: HOSPADM

## 2024-05-03 RX ORDER — SODIUM CHLORIDE 9 MG/ML
40 INJECTION, SOLUTION INTRAVENOUS AS NEEDED
Status: DISCONTINUED | OUTPATIENT
Start: 2024-05-03 | End: 2024-05-03 | Stop reason: HOSPADM

## 2024-05-03 RX ORDER — MIDAZOLAM HYDROCHLORIDE 1 MG/ML
1 INJECTION INTRAMUSCULAR; INTRAVENOUS
Status: DISCONTINUED | OUTPATIENT
Start: 2024-05-03 | End: 2024-05-03 | Stop reason: HOSPADM

## 2024-05-03 RX ORDER — PROPOFOL 10 MG/ML
VIAL (ML) INTRAVENOUS AS NEEDED
Status: DISCONTINUED | OUTPATIENT
Start: 2024-05-03 | End: 2024-05-03 | Stop reason: SURG

## 2024-05-03 RX ORDER — OXYCODONE HYDROCHLORIDE 5 MG/1
5 TABLET ORAL EVERY 8 HOURS PRN
Qty: 10 TABLET | Refills: 0 | Status: SHIPPED | OUTPATIENT
Start: 2024-05-03 | End: 2024-05-08

## 2024-05-03 RX ORDER — SODIUM CHLORIDE 0.9 % (FLUSH) 0.9 %
3-10 SYRINGE (ML) INJECTION AS NEEDED
Status: DISCONTINUED | OUTPATIENT
Start: 2024-05-03 | End: 2024-05-03 | Stop reason: HOSPADM

## 2024-05-03 RX ORDER — NALOXONE HCL 0.4 MG/ML
0.4 VIAL (ML) INJECTION AS NEEDED
Status: DISCONTINUED | OUTPATIENT
Start: 2024-05-03 | End: 2024-05-03 | Stop reason: HOSPADM

## 2024-05-03 RX ORDER — FLUMAZENIL 0.1 MG/ML
0.2 INJECTION INTRAVENOUS AS NEEDED
Status: DISCONTINUED | OUTPATIENT
Start: 2024-05-03 | End: 2024-05-03 | Stop reason: HOSPADM

## 2024-05-03 RX ORDER — SODIUM CHLORIDE, SODIUM LACTATE, POTASSIUM CHLORIDE, CALCIUM CHLORIDE 600; 310; 30; 20 MG/100ML; MG/100ML; MG/100ML; MG/100ML
100 INJECTION, SOLUTION INTRAVENOUS CONTINUOUS
Status: DISCONTINUED | OUTPATIENT
Start: 2024-05-03 | End: 2024-05-03 | Stop reason: HOSPADM

## 2024-05-03 RX ORDER — SODIUM CHLORIDE 0.9 % (FLUSH) 0.9 %
3 SYRINGE (ML) INJECTION EVERY 12 HOURS SCHEDULED
Status: DISCONTINUED | OUTPATIENT
Start: 2024-05-03 | End: 2024-05-03 | Stop reason: HOSPADM

## 2024-05-03 RX ORDER — LIDOCAINE HYDROCHLORIDE 20 MG/ML
INJECTION, SOLUTION EPIDURAL; INFILTRATION; INTRACAUDAL; PERINEURAL AS NEEDED
Status: DISCONTINUED | OUTPATIENT
Start: 2024-05-03 | End: 2024-05-03 | Stop reason: SURG

## 2024-05-03 RX ORDER — DEXAMETHASONE SODIUM PHOSPHATE 4 MG/ML
4 INJECTION, SOLUTION INTRA-ARTICULAR; INTRALESIONAL; INTRAMUSCULAR; INTRAVENOUS; SOFT TISSUE ONCE AS NEEDED
Status: COMPLETED | OUTPATIENT
Start: 2024-05-03 | End: 2024-05-03

## 2024-05-03 RX ORDER — HYDROCODONE BITARTRATE AND ACETAMINOPHEN 5; 325 MG/1; MG/1
1 TABLET ORAL EVERY 4 HOURS PRN
Status: DISCONTINUED | OUTPATIENT
Start: 2024-05-03 | End: 2024-05-03 | Stop reason: HOSPADM

## 2024-05-03 RX ORDER — SCOLOPAMINE TRANSDERMAL SYSTEM 1 MG/1
1 PATCH, EXTENDED RELEASE TRANSDERMAL ONCE
Status: DISCONTINUED | OUTPATIENT
Start: 2024-05-03 | End: 2024-05-03 | Stop reason: HOSPADM

## 2024-05-03 RX ORDER — SODIUM CHLORIDE 0.9 % (FLUSH) 0.9 %
3 SYRINGE (ML) INJECTION AS NEEDED
Status: DISCONTINUED | OUTPATIENT
Start: 2024-05-03 | End: 2024-05-03 | Stop reason: HOSPADM

## 2024-05-03 RX ORDER — ACETAMINOPHEN 325 MG/1
975 TABLET ORAL EVERY 8 HOURS
Start: 2024-05-03 | End: 2025-05-03

## 2024-05-03 RX ORDER — SUCCINYLCHOLINE/SOD CL,ISO/PF 200MG/10ML
SYRINGE (ML) INTRAVENOUS AS NEEDED
Status: DISCONTINUED | OUTPATIENT
Start: 2024-05-03 | End: 2024-05-03 | Stop reason: SURG

## 2024-05-03 RX ORDER — ROCURONIUM BROMIDE 10 MG/ML
INJECTION, SOLUTION INTRAVENOUS AS NEEDED
Status: DISCONTINUED | OUTPATIENT
Start: 2024-05-03 | End: 2024-05-03 | Stop reason: SURG

## 2024-05-03 RX ORDER — LIDOCAINE HYDROCHLORIDE 10 MG/ML
0.5 INJECTION, SOLUTION EPIDURAL; INFILTRATION; INTRACAUDAL; PERINEURAL ONCE AS NEEDED
Status: DISCONTINUED | OUTPATIENT
Start: 2024-05-03 | End: 2024-05-03 | Stop reason: HOSPADM

## 2024-05-03 RX ORDER — HEPARIN SODIUM 5000 [USP'U]/ML
5000 INJECTION, SOLUTION INTRAVENOUS; SUBCUTANEOUS EVERY 8 HOURS SCHEDULED
Status: DISCONTINUED | OUTPATIENT
Start: 2024-05-03 | End: 2024-05-03 | Stop reason: SDUPTHER

## 2024-05-03 RX ORDER — LABETALOL HYDROCHLORIDE 5 MG/ML
5 INJECTION, SOLUTION INTRAVENOUS
Status: DISCONTINUED | OUTPATIENT
Start: 2024-05-03 | End: 2024-05-03 | Stop reason: HOSPADM

## 2024-05-03 RX ORDER — SODIUM CHLORIDE, SODIUM LACTATE, POTASSIUM CHLORIDE, CALCIUM CHLORIDE 600; 310; 30; 20 MG/100ML; MG/100ML; MG/100ML; MG/100ML
1000 INJECTION, SOLUTION INTRAVENOUS CONTINUOUS
Status: DISCONTINUED | OUTPATIENT
Start: 2024-05-03 | End: 2024-05-03 | Stop reason: HOSPADM

## 2024-05-03 RX ORDER — DEXAMETHASONE SODIUM PHOSPHATE 4 MG/ML
INJECTION, SOLUTION INTRA-ARTICULAR; INTRALESIONAL; INTRAMUSCULAR; INTRAVENOUS; SOFT TISSUE AS NEEDED
Status: DISCONTINUED | OUTPATIENT
Start: 2024-05-03 | End: 2024-05-03 | Stop reason: SURG

## 2024-05-03 RX ORDER — ONDANSETRON 2 MG/ML
4 INJECTION INTRAMUSCULAR; INTRAVENOUS ONCE AS NEEDED
Status: DISCONTINUED | OUTPATIENT
Start: 2024-05-03 | End: 2024-05-03 | Stop reason: HOSPADM

## 2024-05-03 RX ORDER — HEPARIN SODIUM 5000 [USP'U]/ML
5000 INJECTION, SOLUTION INTRAVENOUS; SUBCUTANEOUS ONCE
Status: COMPLETED | OUTPATIENT
Start: 2024-05-03 | End: 2024-05-03

## 2024-05-03 RX ORDER — ACETAMINOPHEN 500 MG
1000 TABLET ORAL ONCE
Status: COMPLETED | OUTPATIENT
Start: 2024-05-03 | End: 2024-05-03

## 2024-05-03 RX ORDER — IBUPROFEN 600 MG/1
600 TABLET ORAL EVERY 6 HOURS PRN
Status: DISCONTINUED | OUTPATIENT
Start: 2024-05-03 | End: 2024-05-03 | Stop reason: HOSPADM

## 2024-05-03 RX ORDER — FENTANYL CITRATE 50 UG/ML
INJECTION, SOLUTION INTRAMUSCULAR; INTRAVENOUS AS NEEDED
Status: DISCONTINUED | OUTPATIENT
Start: 2024-05-03 | End: 2024-05-03 | Stop reason: SURG

## 2024-05-03 RX ORDER — ONDANSETRON 4 MG/1
4 TABLET, FILM COATED ORAL EVERY 8 HOURS PRN
Qty: 15 TABLET | Refills: 0 | Status: SHIPPED | OUTPATIENT
Start: 2024-05-03 | End: 2024-05-08

## 2024-05-03 RX ADMIN — HEPARIN SODIUM 5000 UNITS: 5000 INJECTION, SOLUTION INTRAVENOUS; SUBCUTANEOUS at 09:07

## 2024-05-03 RX ADMIN — CEFAZOLIN 2000 MG: 2 INJECTION, POWDER, FOR SOLUTION INTRAMUSCULAR; INTRAVENOUS at 10:10

## 2024-05-03 RX ADMIN — PROPOFOL INJECTABLE EMULSION 200 MG: 10 INJECTION, EMULSION INTRAVENOUS at 10:05

## 2024-05-03 RX ADMIN — DEXAMETHASONE SODIUM PHOSPHATE 4 MG: 4 INJECTION INTRA-ARTICULAR; INTRALESIONAL; INTRAMUSCULAR; INTRAVENOUS; SOFT TISSUE at 09:08

## 2024-05-03 RX ADMIN — SCOPALAMINE 1 PATCH: 1 PATCH, EXTENDED RELEASE TRANSDERMAL at 09:07

## 2024-05-03 RX ADMIN — SODIUM CHLORIDE, POTASSIUM CHLORIDE, SODIUM LACTATE AND CALCIUM CHLORIDE 1000 ML: 600; 310; 30; 20 INJECTION, SOLUTION INTRAVENOUS at 08:42

## 2024-05-03 RX ADMIN — ROCURONIUM BROMIDE 10 MG: 10 INJECTION, SOLUTION INTRAVENOUS at 10:05

## 2024-05-03 RX ADMIN — FENTANYL CITRATE 50 MCG: 50 INJECTION, SOLUTION INTRAMUSCULAR; INTRAVENOUS at 10:27

## 2024-05-03 RX ADMIN — Medication 160 MG: at 10:06

## 2024-05-03 RX ADMIN — FENTANYL CITRATE 50 MCG: 50 INJECTION, SOLUTION INTRAMUSCULAR; INTRAVENOUS at 10:16

## 2024-05-03 RX ADMIN — ACETAMINOPHEN 1000 MG: 500 TABLET, FILM COATED ORAL at 09:07

## 2024-05-03 RX ADMIN — DEXAMETHASONE SODIUM PHOSPHATE 4 MG: 4 INJECTION, SOLUTION INTRAMUSCULAR; INTRAVENOUS at 10:05

## 2024-05-03 RX ADMIN — LIDOCAINE HYDROCHLORIDE 80 MG: 20 INJECTION, SOLUTION EPIDURAL; INFILTRATION; INTRACAUDAL; PERINEURAL at 10:05

## 2024-05-03 NOTE — ANESTHESIA POSTPROCEDURE EVALUATION
Patient: Kacie Baron    Procedure Summary       Date: 05/03/24 Room / Location:  [facility] OR  /  [facility] OR    Anesthesia Start: 1000 Anesthesia Stop: 1059    Procedure: LEFT BREAST LUMPECTOMY WITH MAG SEED GUIDANCE (excision of intraductal papilloma) (Left: Breast) Diagnosis:       Intraductal papilloma of breast, left      (Intraductal papilloma of breast, left [D24.2])    Surgeons: Ava Rollins MD Provider: Constantino Leon CRNA    Anesthesia Type: general ASA Status: 3            Anesthesia Type: general    Vitals  Vitals Value Taken Time   /58 05/03/24 1145   Temp 97.6 °F (36.4 °C) 05/03/24 1054   Pulse 69 05/03/24 1155   Resp 14 05/03/24 1145   SpO2 91 % 05/03/24 1155   Vitals shown include unfiled device data.        Post Anesthesia Care and Evaluation    Patient location during evaluation: PACU  Patient participation: complete - patient participated  Level of consciousness: awake and alert  Pain management: adequate    Airway patency: patent  Anesthetic complications: No anesthetic complications    Cardiovascular status: acceptable  Respiratory status: acceptable  Hydration status: acceptable    Comments: Blood pressure 136/58, pulse 67, temperature 97.6 °F (36.4 °C), temperature source Temporal, resp. rate 14, SpO2 96%, not currently breastfeeding.    Pt discharged from PACU based on musa score >8

## 2024-05-03 NOTE — OP NOTE
"Left Magseed Guided Lumpectomy Operative Report:     Patient: Kacie Baron  MRN: [id number]    YOB: 1960  Age: 64 y.o.  Sex: female  Unit:  [facility] OR Room/Bed: [facility] OR/MAIN OR Location: [facility]      Admitting Physician: SUZANNE MOROCHO    Primary Care Physician: Pernell Mccoy MD             INDICATIONS: Ms. Baron is a 64-year-old female who presents to the clinic with biopsy-proven intraductal papilloma of the left breast.  After communication with Dr. Myers, the radiologist, imaging and pathology are concordant for this.  However due to \"scant\" specimen and bloody nipple discharge, it has been recommended that the patient undergo surgical excision.     DATE OF OPERATION: 5/3/2024     Surgeons and Role:     * Suzanne Morocho MD - Primary  Venecia Houston PA-C - assist    ANESTHESIA: General     PREOPERATIVE DIAGNOSIS: Intraductal papilloma of breast, left [D24.2]    POSTOPERATIVE DIAGNOSIS: Same    PROCEDURES PERFORMED:    (1) Left Magseed Guided Lumpectomy     PROCEDURE DETAILS:    After informed consent was obtained, the patient was brought to the operating room and the site of surgery was confirmed. General anesthesia was induced.  The left chest and left axilla were prepped with ChloraPrep and draped in sterile fashion. A time out was completed verifying the correct patient, procedure, site, positioning and special equipment needed prior to incision.   The Mag-Probe was used to localize the excision target with the preoperatively placed Magseed on the left breast in the 9:00 position. Local anesthetic was infiltrated. An incision was made over the seed and lesion along the areolar border. Skin flaps were raised circumferentially. I dissected down to the lesion with cautery, ensuring to use the Mag-Probe to guide me. When I was close to the lesion based on counts, a plastic [place] was placed on the tissue and the lesion and a rim of normal tissue was dissected free with " cautery. It was marked with a short stitch superior, double long lateral and single long anterior. It was placed on the grid in the trident and then sent for permanent pathology. The mammogram noted the seed, clip and lesion in the original specimen. Hemostasis was obtained in the cavity. The cavity was irrigated. Clips were placed for post-op radiation guidance. The incision was closed with interrupted 3-0 Vicryl dermal sutures followed by a running 4-0 Monocryl subcuticular. The breast incision was then dressed with glue, Telfa and Tegaderm and covered with a surgical bra. The patient tolerated the procedure well and was transferred to PACU in good condition.     Venecia Houston PA-C was responsible for performing the following activities: Retraction, Suction, Irrigation, Suturing, Closing, and Placing Dressing and their skilled assistance was necessary for the success of this case.      Findings: seed, clip and lesion in specimen    Estimated Blood Loss:  20 mL   Complications: none apparent   Specimens: Left lumpectomy - double short superior, double long left lateral, single long anterior      This procedure was not performed to treat breast cancer through sentinel node biopsy    Disposition: PACU - hemodynamically stable.           Condition: stable    Ava Rollins MD  04/10/2024

## 2024-05-03 NOTE — ANESTHESIA PROCEDURE NOTES
Airway  Urgency: elective    Date/Time: 5/3/2024 10:07 AM  End Time:5/3/2024 10:07 AM  Airway not difficult    General Information and Staff    Patient location during procedure: OR  SRNA: Shauna Pinzon SRNA  Indications and Patient Condition  Indications for airway management: airway protection    Preoxygenated: yes  MILS maintained throughout  Mask difficulty assessment: 0 - not attempted    Final Airway Details  Final airway type: endotracheal airway      Successful airway: ETT  Cuffed: yes   Successful intubation technique: video laryngoscopy  Facilitating devices/methods: intubating stylet, anterior pressure/BURP and cricoid pressure  Endotracheal tube insertion site: oral  Blade: Grissom  Blade size: 3 (could use a 4, cords were deep)  ETT size (mm): 7.0  Cormack-Lehane Classification: grade IIa - partial view of glottis  Placement verified by: chest auscultation and capnometry   Cuff volume (mL): 8  Measured from: lips  ETT/EBT  to lips (cm): 22  Number of attempts at approach: 1  Assessment: lips, teeth, and gum same as pre-op and atraumatic intubation

## 2024-05-03 NOTE — DISCHARGE INSTRUCTIONS
Wound:   - you have skin glue on your incisions. Leave the dressing over it. Okay to shower tomorrow.   - Leave skin glue in place, it should slowly fall off over 2 weeks   - No swimming/soaking/bathing x 2 weeks to allow incisions to heal.     Activity:   - Activity as tolerated. NO heavy lifting x 2 weeks with the left arm - no more than 25lb at a time.   - No driving or operating machinery on narcotic pain medication.     Pain medication:   - Take 1000mg of tylenol every 8 hours for 3 days. After three days, take it prn.   - Take 600mg of ibuprofen (motrin) every 8 hours for 3 days. After three days, take it prn.   - You have a prescription for a narcotic. It will be roxicodone 5mg tabs. Take these only as needed after you have taken the tylenol and ibuprofen. If you are taking the roxicodone, make sure to take a stool softener (colace) with it as it can cause constipation.   - The narcotic may make you nauseated, you will have a prescription for zofran in case of nausea.     Follow up:   - make an appointment to see me in 1 week   - If you have any concerns before then, call me office at [phone]

## 2024-05-03 NOTE — ANESTHESIA PREPROCEDURE EVALUATION
Anesthesia Evaluation     Patient summary reviewed   history of anesthetic complications:  PONV  NPO Solid Status: > 8 hours             Airway   Mallampati: II  TM distance: >3 FB  No difficulty expected  Dental      Pulmonary    (+) ,sleep apnea on CPAP  (-) asthma, not a smoker  Cardiovascular   Exercise tolerance: good (4-7 METS)    (+) hypertension, hyperlipidemia      Neuro/Psych  (-) seizures, TIA, CVA  GI/Hepatic/Renal/Endo    (+) morbid obesity, GERD, diabetes mellitus, thyroid problem   (-) liver disease, no renal disease    Musculoskeletal     Abdominal    Substance History      OB/GYN          Other                      Anesthesia Plan    ASA 3     general     intravenous induction     Anesthetic plan, risks, benefits, and alternatives have been provided, discussed and informed consent has been obtained with: patient.    CODE STATUS:

## 2024-05-08 ENCOUNTER — OFFICE VISIT (OUTPATIENT)
Dept: CARDIOLOGY | Facility: CLINIC | Age: 64
End: 2024-05-08
Payer: MEDICARE

## 2024-05-08 VITALS
HEIGHT: 69 IN | WEIGHT: 288 LBS | DIASTOLIC BLOOD PRESSURE: 68 MMHG | BODY MASS INDEX: 42.65 KG/M2 | SYSTOLIC BLOOD PRESSURE: 108 MMHG | OXYGEN SATURATION: 96 % | HEART RATE: 81 BPM | RESPIRATION RATE: 18 BRPM

## 2024-05-08 DIAGNOSIS — I50.22 CHRONIC SYSTOLIC CONGESTIVE HEART FAILURE: Primary | ICD-10-CM

## 2024-05-08 DIAGNOSIS — E11.9 TYPE 2 DIABETES MELLITUS WITHOUT COMPLICATION, WITHOUT LONG-TERM CURRENT USE OF INSULIN: ICD-10-CM

## 2024-05-08 DIAGNOSIS — E78.5 DYSLIPIDEMIA: ICD-10-CM

## 2024-05-08 DIAGNOSIS — G47.39 OTHER SLEEP APNEA: ICD-10-CM

## 2024-05-08 DIAGNOSIS — E66.01 CLASS 3 SEVERE OBESITY DUE TO EXCESS CALORIES WITH SERIOUS COMORBIDITY AND BODY MASS INDEX (BMI) OF 40.0 TO 44.9 IN ADULT: ICD-10-CM

## 2024-05-08 DIAGNOSIS — I10 ESSENTIAL HYPERTENSION: ICD-10-CM

## 2024-05-08 DIAGNOSIS — E03.9 HYPOTHYROIDISM, UNSPECIFIED TYPE: ICD-10-CM

## 2024-05-09 LAB
CYTO UR: NORMAL
LAB AP CASE REPORT: NORMAL
LAB AP DIAGNOSIS COMMENT: NORMAL
Lab: NORMAL
PATH REPORT.FINAL DX SPEC: NORMAL
PATH REPORT.GROSS SPEC: NORMAL

## 2024-05-13 ENCOUNTER — OFFICE VISIT (OUTPATIENT)
Dept: SURGERY | Facility: CLINIC | Age: 64
End: 2024-05-13
Payer: MEDICARE

## 2024-05-13 VITALS
HEART RATE: 80 BPM | SYSTOLIC BLOOD PRESSURE: 130 MMHG | WEIGHT: 288 LBS | OXYGEN SATURATION: 97 % | BODY MASS INDEX: 43.65 KG/M2 | HEIGHT: 68 IN | DIASTOLIC BLOOD PRESSURE: 80 MMHG

## 2024-05-13 DIAGNOSIS — D24.2 INTRADUCTAL PAPILLOMA OF BREAST, LEFT: Primary | ICD-10-CM

## 2024-05-13 PROCEDURE — 3075F SYST BP GE 130 - 139MM HG: CPT | Performed by: STUDENT IN AN ORGANIZED HEALTH CARE EDUCATION/TRAINING PROGRAM

## 2024-05-13 PROCEDURE — 99024 POSTOP FOLLOW-UP VISIT: CPT | Performed by: STUDENT IN AN ORGANIZED HEALTH CARE EDUCATION/TRAINING PROGRAM

## 2024-05-13 PROCEDURE — 3079F DIAST BP 80-89 MM HG: CPT | Performed by: STUDENT IN AN ORGANIZED HEALTH CARE EDUCATION/TRAINING PROGRAM

## 2024-05-13 PROCEDURE — 1159F MED LIST DOCD IN RCRD: CPT | Performed by: STUDENT IN AN ORGANIZED HEALTH CARE EDUCATION/TRAINING PROGRAM

## 2024-05-13 PROCEDURE — 1160F RVW MEDS BY RX/DR IN RCRD: CPT | Performed by: STUDENT IN AN ORGANIZED HEALTH CARE EDUCATION/TRAINING PROGRAM

## 2024-05-13 NOTE — PATIENT INSTRUCTIONS

## 2024-05-13 NOTE — PROGRESS NOTES
"Patient: Kacie Baron    YOB: 1960    Date: 05/13/2024    Primary Care Provider: Pernell Mccoy MD    Vital Signs:   Vitals:    05/13/24 0839   BP: 130/80   Pulse: 80   SpO2: 97%   Weight: 131 kg (288 lb)   Height: 172.7 cm (68\")       The patient is tolerating a regular diet and has no complaints s/p excision of left breast intraductal papilloma. The patient denies fevers, chills, nausea, vomiting, and excessive pain. Incision well healed.     Results Review:   I reviewed the patient's new clinical results.  Tissue Pathology Exam (05/03/2024 10:25)   Left breast, lumpectomy:  A.  Intraductal papilloma exhibiting usual ductal hyperplasia.  B.  Biopsy site changes.  C.  No histologic evidence of malignancy.    Assessment / Plan:    Diagnoses and all orders for this visit:    1. Intraductal papilloma of breast, left (Primary)  -     Mammo Diagnostic Digital Tomosynthesis Left With CAD; Future      Left diagnostic mammogram in 6 months then follow up with RAUL Sena.     Electronically signed by Ava Rollins MD  05/13/24  08:51 CDT                  "

## 2024-08-05 ENCOUNTER — TRANSCRIBE ORDERS (OUTPATIENT)
Dept: ADMINISTRATIVE | Facility: HOSPITAL | Age: 64
End: 2024-08-05
Payer: MEDICARE

## 2024-08-05 DIAGNOSIS — Z78.0 MENOPAUSE: Primary | ICD-10-CM

## 2024-08-15 ENCOUNTER — HOSPITAL ENCOUNTER (OUTPATIENT)
Dept: BONE DENSITY | Facility: HOSPITAL | Age: 64
Discharge: HOME | End: 2024-08-15
Admitting: PHYSICIAN ASSISTANT
Payer: MEDICARE

## 2024-08-15 DIAGNOSIS — Z78.0 MENOPAUSE: ICD-10-CM

## 2024-08-15 PROCEDURE — 77080 DXA BONE DENSITY AXIAL: CPT

## 2024-08-21 ENCOUNTER — TRANSCRIBE ORDERS (OUTPATIENT)
Dept: ADMINISTRATIVE | Facility: HOSPITAL | Age: 64
End: 2024-08-21
Payer: MEDICARE

## 2024-08-21 DIAGNOSIS — M54.16 LUMBAR RADICULOPATHY: Primary | ICD-10-CM

## 2024-08-22 ENCOUNTER — APPOINTMENT (OUTPATIENT)
Dept: CT IMAGING | Facility: HOSPITAL | Age: 64
End: 2024-08-22
Payer: MEDICARE

## 2024-08-22 ENCOUNTER — HOSPITAL ENCOUNTER (EMERGENCY)
Facility: HOSPITAL | Age: 64
Discharge: HOME | End: 2024-08-22
Payer: MEDICARE

## 2024-08-22 ENCOUNTER — APPOINTMENT (OUTPATIENT)
Dept: ULTRASOUND IMAGING | Facility: HOSPITAL | Age: 64
End: 2024-08-22
Payer: MEDICARE

## 2024-08-22 ENCOUNTER — TRANSCRIBE ORDERS (OUTPATIENT)
Dept: ADMINISTRATIVE | Facility: HOSPITAL | Age: 64
End: 2024-08-22
Payer: MEDICARE

## 2024-08-22 VITALS
DIASTOLIC BLOOD PRESSURE: 85 MMHG | WEIGHT: 283 LBS | TEMPERATURE: 97.8 F | HEART RATE: 66 BPM | BODY MASS INDEX: 41.92 KG/M2 | OXYGEN SATURATION: 98 % | HEIGHT: 69 IN | SYSTOLIC BLOOD PRESSURE: 165 MMHG | RESPIRATION RATE: 18 BRPM

## 2024-08-22 DIAGNOSIS — M47.819 FACET ARTHROPATHY: ICD-10-CM

## 2024-08-22 DIAGNOSIS — M19.90 OSTEOARTHRITIS, UNSPECIFIED OSTEOARTHRITIS TYPE, UNSPECIFIED SITE: ICD-10-CM

## 2024-08-22 DIAGNOSIS — M50.30 DISC-OSTEOPHYTE COMPLEX: ICD-10-CM

## 2024-08-22 DIAGNOSIS — M25.78 DISC-OSTEOPHYTE COMPLEX: ICD-10-CM

## 2024-08-22 DIAGNOSIS — M25.551 PAIN IN RIGHT HIP: Primary | ICD-10-CM

## 2024-08-22 DIAGNOSIS — M47.816 LUMBAR SPONDYLOSIS: Primary | ICD-10-CM

## 2024-08-22 PROCEDURE — 25010000002 ONDANSETRON PER 1 MG: Performed by: NURSE PRACTITIONER

## 2024-08-22 PROCEDURE — 96376 TX/PRO/DX INJ SAME DRUG ADON: CPT

## 2024-08-22 PROCEDURE — 96374 THER/PROPH/DIAG INJ IV PUSH: CPT

## 2024-08-22 PROCEDURE — 25010000002 HYDROMORPHONE PER 4 MG: Performed by: NURSE PRACTITIONER

## 2024-08-22 PROCEDURE — 25010000002 METHYLPREDNISOLONE PER 125 MG: Performed by: NURSE PRACTITIONER

## 2024-08-22 PROCEDURE — 72192 CT PELVIS W/O DYE: CPT

## 2024-08-22 PROCEDURE — 99284 EMERGENCY DEPT VISIT MOD MDM: CPT

## 2024-08-22 PROCEDURE — 93971 EXTREMITY STUDY: CPT

## 2024-08-22 PROCEDURE — 72131 CT LUMBAR SPINE W/O DYE: CPT

## 2024-08-22 PROCEDURE — 96375 TX/PRO/DX INJ NEW DRUG ADDON: CPT

## 2024-08-22 PROCEDURE — 93971 EXTREMITY STUDY: CPT | Performed by: SURGERY

## 2024-08-22 RX ORDER — HYDROMORPHONE HYDROCHLORIDE 1 MG/ML
0.25 INJECTION, SOLUTION INTRAMUSCULAR; INTRAVENOUS; SUBCUTANEOUS ONCE
Status: COMPLETED | OUTPATIENT
Start: 2024-08-22 | End: 2024-08-22

## 2024-08-22 RX ORDER — SODIUM CHLORIDE 0.9 % (FLUSH) 0.9 %
10 SYRINGE (ML) INJECTION AS NEEDED
Status: DISCONTINUED | OUTPATIENT
Start: 2024-08-22 | End: 2024-08-22 | Stop reason: HOSPADM

## 2024-08-22 RX ORDER — ONDANSETRON 2 MG/ML
4 INJECTION INTRAMUSCULAR; INTRAVENOUS ONCE
Status: COMPLETED | OUTPATIENT
Start: 2024-08-22 | End: 2024-08-22

## 2024-08-22 RX ORDER — HYDROCODONE BITARTRATE AND ACETAMINOPHEN 7.5; 325 MG/1; MG/1
1 TABLET ORAL EVERY 4 HOURS PRN
Qty: 15 TABLET | Refills: 0 | Status: SHIPPED | OUTPATIENT
Start: 2024-08-22

## 2024-08-22 RX ORDER — METHYLPREDNISOLONE SODIUM SUCCINATE 125 MG/2ML
125 INJECTION, POWDER, LYOPHILIZED, FOR SOLUTION INTRAMUSCULAR; INTRAVENOUS ONCE
Status: COMPLETED | OUTPATIENT
Start: 2024-08-22 | End: 2024-08-22

## 2024-08-22 RX ORDER — HYDROMORPHONE HYDROCHLORIDE 1 MG/ML
0.5 INJECTION, SOLUTION INTRAMUSCULAR; INTRAVENOUS; SUBCUTANEOUS ONCE
Status: COMPLETED | OUTPATIENT
Start: 2024-08-22 | End: 2024-08-22

## 2024-08-22 RX ORDER — ONDANSETRON 4 MG/1
4 TABLET, ORALLY DISINTEGRATING ORAL EVERY 6 HOURS PRN
Qty: 12 TABLET | Refills: 0 | Status: SHIPPED | OUTPATIENT
Start: 2024-08-22

## 2024-08-22 RX ADMIN — ONDANSETRON 4 MG: 2 INJECTION INTRAMUSCULAR; INTRAVENOUS at 11:02

## 2024-08-22 RX ADMIN — ONDANSETRON 4 MG: 2 INJECTION INTRAMUSCULAR; INTRAVENOUS at 13:57

## 2024-08-22 RX ADMIN — METHYLPREDNISOLONE SODIUM SUCCINATE 125 MG: 125 INJECTION, POWDER, FOR SOLUTION INTRAMUSCULAR; INTRAVENOUS at 11:02

## 2024-08-22 RX ADMIN — HYDROMORPHONE HYDROCHLORIDE 0.25 MG: 1 INJECTION, SOLUTION INTRAMUSCULAR; INTRAVENOUS; SUBCUTANEOUS at 13:57

## 2024-08-22 RX ADMIN — HYDROMORPHONE HYDROCHLORIDE 0.5 MG: 1 INJECTION, SOLUTION INTRAMUSCULAR; INTRAVENOUS; SUBCUTANEOUS at 11:02

## 2024-08-22 NOTE — ED PROVIDER NOTES
Subjective   History of Present Illness  Patient is a 64-year-old female who presents to the ER with chief complaints of right leg pain.  Patient states she began experiencing pain to her right leg approximately 1 month ago.  She has an orthopedic surgeon in [place].  She states that he ordered x-ray of her right hip and believes she may have bursitis.  He referred patient to physical therapy however states she was only able to complete 3 days of therapy due to worsening right hip and right leg pain.  Patient was seen by her PCP on Tuesday and diagnosed with radiculopathy.  She was prescribed steroids and a muscle relaxant which has not been helping with her overall symptoms.  She complains of pain all throughout her right leg as well as pain to her right hip in particular with sitting and mild discomfort to her low back.  She states that she has history of a discectomy to L5-S1 performed in [place] in 2007.  Patient denies any loss of bowel or bladder control or saddle anesthesia, she is neurologically intact.  Denies any weakness.  She has MRI scheduled outpatient through her PCP however due to continued symptoms she came to the ER for evaluation and treatment.  Past medical history significant for arthritis, depression, diabetes, GERD, hyperlipidemia, hypertension, insomnia, sleep apnea, thyroid disease, obesity        Review of Systems   Constitutional: Negative.  Negative for fever.   HENT: Negative.  Negative for congestion.    Respiratory: Negative.  Negative for cough and shortness of breath.    Cardiovascular: Negative.  Negative for leg swelling.   Gastrointestinal: Negative.  Negative for abdominal pain, constipation, diarrhea, nausea and vomiting.   Genitourinary: Negative.  Negative for dysuria.   Musculoskeletal:  Positive for back pain.   Skin: Negative.  Negative for wound.   Neurological: Negative.  Negative for weakness and numbness.   All other systems reviewed and are negative.      Past  Medical History:   Diagnosis Date    Arthritis     Depression     mild started with death of mother 20 yrs ago    Diabetes mellitus     DM (diabetes mellitus screen)     GERD (gastroesophageal reflux disease)     HLD (hyperlipidemia)     HTN (hypertension)     Insomnia     PONV (postoperative nausea and vomiting)     Sleep apnea     c-pap    Thyroid condition        No Known Allergies    Past Surgical History:   Procedure Laterality Date    BACK SURGERY Left     2007 lumbar/sacral    BREAST LUMPECTOMY Left 5/3/2024    Procedure: LEFT BREAST LUMPECTOMY WITH MAG SEED GUIDANCE (excision of intraductal papilloma);  Surgeon: Ava Rollins MD;  Location: [facility] OR;  Service: General;  Laterality: Left;    CARDIAC CATHETERIZATION      CHOLECYSTECTOMY  09/23/2021    [place]/ DR PEREZ    COLONOSCOPY      2016    CYST REMOVAL      neck it was a spider bite    DILATATION AND CURETTAGE      TONSILLECTOMY AND ADENOIDECTOMY      TUBAL ABDOMINAL LIGATION         Family History   Problem Relation Age of Onset    Heart disease Mother     Heart attack Mother     Heart disease Father     Heart attack Father     Breast cancer Maternal Grandmother     Breast cancer Paternal Grandmother        Social History     Socioeconomic History    Marital status:    Tobacco Use    Smoking status: Never     Passive exposure: Never    Smokeless tobacco: Never   Vaping Use    Vaping status: Never Used   Substance and Sexual Activity    Alcohol use: No    Drug use: Never    Sexual activity: Not Currently     Partners: Male     Birth control/protection: Tubal ligation           Objective   Physical Exam  Vitals and nursing note reviewed.   Constitutional:       General: She is in acute distress.      Appearance: She is well-developed.   HENT:      Head: Normocephalic and atraumatic.      Right Ear: External ear normal.      Left Ear: External ear normal.      Nose: Nose normal.      Mouth/Throat:      Pharynx: Oropharynx is clear.    Eyes:      Extraocular Movements: Extraocular movements intact.      Conjunctiva/sclera: Conjunctivae normal.      Pupils: Pupils are equal, round, and reactive to light.   Cardiovascular:      Rate and Rhythm: Normal rate and regular rhythm.      Pulses: Normal pulses.      Heart sounds: Normal heart sounds.   Pulmonary:      Effort: Pulmonary effort is normal.      Breath sounds: Normal breath sounds.   Abdominal:      General: Bowel sounds are normal.      Palpations: Abdomen is soft.   Musculoskeletal:         General: Tenderness present.      Cervical back: Normal range of motion and neck supple.      Comments: Patient has pain to palpation to the lumbar spine as well as the right hip without step-off or vertebral point tenderness noted, range of motion is intact however with pain, pulses palpable bilaterally, she is neurologically neurovascularly intact, denies any loss of bowel or bladder control or saddle anesthesia   Skin:     General: Skin is warm and dry.   Neurological:      Mental Status: She is alert and oriented to person, place, and time.   Psychiatric:         Mood and Affect: Mood normal.         Behavior: Behavior normal.         Thought Content: Thought content normal.         Judgment: Judgment normal.         Procedures           ED Course  ED Course as of 08/22/24 1748   Thu Aug 22, 2024   1206 Ida Hernández NICK on 8/22/2024 11:25 AM CDT  RLE: no thrombus vis  Betzaida Macias on 8/22/2024 11:20 AM CDT  Known occlusion Lt ICA     [TW]      ED Course User Index  [TW] Lazara Crooks APRN                                             Medical Decision Making  Patient is a 64-year-old female who presents to the ER with chief complaints of right leg pain.  Patient states she began experiencing pain to her right leg approximately 1 month ago.  She has an orthopedic surgeon in [place].  She states that he ordered x-ray of her right hip and believes she may have bursitis.  He referred patient to  physical therapy however states she was only able to complete 3 days of therapy due to worsening right hip and right leg pain.  Patient was seen by her PCP on Tuesday and diagnosed with radiculopathy.  She was prescribed steroids and a muscle relaxant which has not been helping with her overall symptoms.  She complains of pain all throughout her right leg as well as pain to her right hip in particular with sitting and mild discomfort to her low back.  She states that she has history of a discectomy to L5-S1 performed in [place] in 2007.  Patient denies any loss of bowel or bladder control or saddle anesthesia, she is neurologically intact.  Denies any weakness.  She has MRI scheduled outpatient through her PCP however due to continued symptoms she came to the ER for evaluation and treatment.  Past medical history significant for arthritis, depression, diabetes, GERD, hyperlipidemia, hypertension, insomnia, sleep apnea, thyroid disease, obesity  Differential diagnosis: Degenerative disc disease, bulging disc, spinous fracture, pelvic fracture, DVT, and other    Labs Reviewed - No data to display     US Venous Doppler Lower Extremity Right (duplex)   Final Result    Impression: There is no evidence of deep venous thrombosis or    superficial thrombophlebitis of the right lower extremity.         Comments: Right lower extremity venous duplex exam was performed using    color Doppler flow, Doppler wave form analysis, and grayscale imaging,    with and without compression. There is no evidence of deep venous    thrombosis of the common femoral, superficial femoral, popliteal,    posterior tibial, and peroneal veins. There is no thrombus identified in    the saphenofemoral junction or the greater saphenous vein.               This report was signed and finalized on 8/22/2024 1:36 PM by Dr. Ricco Richardson MD.          CT Pelvis Without Contrast   Final Result    1. No visualized fracture or malalignment.    2.  Bilateral hip joint osteoarthritis change which appears mild to    moderate.    3. Pelvic ring is intact.                   This report was signed and finalized on 8/22/2024 11:37 AM by Dr Gama Sparrow.          CT Lumbar Spine Without Contrast   Final Result    1. Lumbar spondylosis. Moderate loss of lumbar lordosis.    2. Multilevel prominent disc osteophyte complexes, facet arthropathy and    ligamental hypertrophy and resultant multilevel neuroforaminal stenosis.    Spinal canal is patent.    3. No acute fracture or malalignment.                                                                          This report was signed and finalized on 8/22/2024 11:38 AM by Dr. Mayra Peraza MD.       Venous duplex Doppler ultrasound is negative for DVT.  Patient has lumbar spondylosis with prominent disc osteophyte complexes noted at multilevels, she has bulging disks noted, she has a patent spinal canal, no acute fracture, osteoarthritis noted to the bilateral hip joint in particular to the right.  Patient received a steroid shot as well as pain medication and nausea medicine in the ER.  She is able to walk without any significant difficulty.  She is neurologically and neurovascularly intact, denies any loss of bowel or bladder control or saddle anesthesia, no weakness.  She reports feeling tremendously better on reexam.  We will add pain medication along with her steroids and muscle relaxant.  She will need to follow-up with PCP and follow through with MRIs that have been ordered.  Today she did not meet requirement for emergent MRIs to the ER.  She will be discharged home shortly in stable condition.    Problems Addressed:  Disc-osteophyte complex: chronic illness or injury  Facet arthropathy: chronic illness or injury  Lumbar spondylosis: chronic illness or injury  Osteoarthritis, unspecified osteoarthritis type, unspecified site: chronic illness or injury    Amount and/or Complexity of Data  Reviewed  Radiology: ordered. Decision-making details documented in ED Course.    Risk  Prescription drug management.        Final diagnoses:   Lumbar spondylosis   Disc-osteophyte complex   Facet arthropathy   Osteoarthritis, unspecified osteoarthritis type, unspecified site       ED Disposition  ED Disposition       ED Disposition   Discharge    Condition   Good    Comment   --               No follow-up provider specified.       Medication List        New Prescriptions      HYDROcodone-acetaminophen 7.5-325 MG per tablet  Commonly known as: NORCO  Take 1 tablet by mouth Every 4 (Four) Hours As Needed for Moderate Pain.     ondansetron ODT 4 MG disintegrating tablet  Commonly known as: ZOFRAN-ODT  Place 1 tablet on the tongue Every 6 (Six) Hours As Needed for Nausea.               Where to Get Your Medications        These medications were sent to [id number] - [place], [place] - [address] [facility] [address] - [phone]  - [phone] FX  [address], [facility] [address]      Phone: [phone]   HYDROcodone-acetaminophen 7.5-325 MG per tablet  ondansetron ODT 4 MG disintegrating tablet            Lazara Crooks, APRN  08/22/24 5313

## 2024-08-22 NOTE — DISCHARGE INSTRUCTIONS
Consider over the counter stool softener while taking this medication as it may cause constipation    Warm moist heat to the lumbar spine as needed    You may continue your muscle relaxer as well as steroids that were previously prescribed    I hope you feel better soon!!

## 2024-09-17 ENCOUNTER — HOSPITAL ENCOUNTER (OUTPATIENT)
Dept: MRI IMAGING | Age: 64
Discharge: HOME | End: 2024-09-17
Payer: MEDICARE

## 2024-09-17 DIAGNOSIS — M25.551 PAIN IN RIGHT HIP: ICD-10-CM

## 2024-09-17 DIAGNOSIS — M54.16 RADICULOPATHY, LUMBAR REGION: ICD-10-CM

## 2024-09-17 PROCEDURE — 72148 MRI LUMBAR SPINE W/O DYE: CPT

## 2024-09-17 PROCEDURE — 73721 MRI JNT OF LWR EXTRE W/O DYE: CPT

## 2024-11-01 LAB
NCCN CRITERIA FLAG: NORMAL
TYRER CUZICK SCORE: 5.1

## 2024-11-11 ENCOUNTER — HOSPITAL ENCOUNTER (OUTPATIENT)
Dept: MAMMOGRAPHY | Facility: HOSPITAL | Age: 64
Discharge: HOME | End: 2024-11-11
Admitting: STUDENT IN AN ORGANIZED HEALTH CARE EDUCATION/TRAINING PROGRAM
Payer: MEDICARE

## 2024-11-11 DIAGNOSIS — D24.2 INTRADUCTAL PAPILLOMA OF BREAST, LEFT: ICD-10-CM

## 2024-11-11 PROCEDURE — 77066 DX MAMMO INCL CAD BI: CPT

## 2024-11-11 PROCEDURE — G0279 TOMOSYNTHESIS, MAMMO: HCPCS

## 2024-11-13 ENCOUNTER — OFFICE VISIT (OUTPATIENT)
Age: 64
End: 2024-11-13
Payer: MEDICARE

## 2024-11-13 VITALS
DIASTOLIC BLOOD PRESSURE: 66 MMHG | HEIGHT: 69 IN | OXYGEN SATURATION: 95 % | SYSTOLIC BLOOD PRESSURE: 126 MMHG | BODY MASS INDEX: 42.95 KG/M2 | HEART RATE: 80 BPM | WEIGHT: 290 LBS

## 2024-11-13 DIAGNOSIS — D24.2 INTRADUCTAL PAPILLOMA OF BREAST, LEFT: Primary | ICD-10-CM

## 2024-11-13 DIAGNOSIS — Z12.31 SCREENING MAMMOGRAM FOR BREAST CANCER: ICD-10-CM

## 2024-11-13 NOTE — PROGRESS NOTES
Office Established Patient Note:     Referring Provider: No ref. provider found    Chief Complaint   Patient presents with    Follow-up       Subjective .     History of present illness:  Kacie Baron is a 64 y.o. female who presents to the clinic for 6 month breast follow up. She has a history of excision of intraductal papilloma by Dr. Rollins in May 2024. Her TC score is 5.1. Today she has no concerns with her breasts. She denies palpable masses, nipple discharge, or skin changes of either breast.     BMI is 42.83. She is a known smoker. She does not take any blood thinners.     Review of Systems    Review of Systems - General ROS: negative  ENT ROS: negative  Respiratory ROS: no cough, shortness of breath, or wheezing  Cardiovascular ROS: no chest pain or dyspnea on exertion  Gastrointestinal ROS: no abdominal pain, change in bowel habits, or black or bloody stools  Genito-Urinary ROS: no dysuria, trouble voiding, or hematuria  Dermatological ROS: negative   Breast ROS: negative for breast lumps  Hematological and Lymphatic ROS: negative  Musculoskeletal ROS: negative   Neurological ROS: no TIA or stroke symptoms    Psychological ROS: negative  Endocrine ROS: negative    History  Past Medical History:   Diagnosis Date    Arthritis     Depression     mild started with death of mother 20 yrs ago    Diabetes mellitus     DM (diabetes mellitus screen)     GERD (gastroesophageal reflux disease)     HLD (hyperlipidemia)     HTN (hypertension)     Insomnia     PONV (postoperative nausea and vomiting)     Sleep apnea     c-pap    Thyroid condition    ,   Past Surgical History:   Procedure Laterality Date    BACK SURGERY Left     2007 lumbar/sacral    BREAST BIOPSY Left 04/26/2024    BREAST LUMPECTOMY Left 05/03/2024    Procedure: LEFT BREAST LUMPECTOMY WITH MAG SEED GUIDANCE (excision of intraductal papilloma);  Surgeon: Ava Rollins MD;  Location: [facility] OR;  Service: General;  Laterality: Left;    CARDIAC  CATHETERIZATION      CHOLECYSTECTOMY  09/23/2021    [place]/ DR PEREZ    COLONOSCOPY      2016    CYST REMOVAL      neck it was a spider bite    DILATATION AND CURETTAGE      TONSILLECTOMY AND ADENOIDECTOMY      TUBAL ABDOMINAL LIGATION     ,   Family History   Problem Relation Age of Onset    Heart disease Mother     Heart attack Mother     Heart disease Father     Heart attack Father     Breast cancer Maternal Grandmother     Breast cancer Paternal Grandmother    ,   Social History     Tobacco Use    Smoking status: Never     Passive exposure: Never    Smokeless tobacco: Never   Vaping Use    Vaping status: Never Used   Substance Use Topics    Alcohol use: No    Drug use: Never   , (Not in a hospital admission)   and Allergies:  Patient has no known allergies.    Current Outpatient Medications:     aspirin 81 MG EC tablet, Take 1 tablet by mouth Daily., Disp: , Rfl:     carvedilol (COREG) 6.25 MG tablet, Take 1 tablet by mouth 2 (Two) Times a Day With Meals., Disp: 60 tablet, Rfl: 11    Cholecalciferol (VITAMIN D3) 5000 UNITS tablet, Take 1 tablet by mouth Daily., Disp: , Rfl:     FLUoxetine (PROzac) 40 MG capsule, Take 1 capsule by mouth Daily., Disp: , Rfl:     furosemide (LASIX) 20 MG tablet, Take 1 tablet by mouth As Needed (fluid)., Disp: , Rfl:     ibuprofen (Motrin IB) 200 MG tablet, Take 3 tablets by mouth Every 8 (Eight) Hours. Take every 8 hours for three days then take as needed., Disp: , Rfl:     Jardiance 25 MG tablet tablet, Take 1 tablet by mouth Daily., Disp: , Rfl:     levothyroxine (SYNTHROID, LEVOTHROID) 150 MCG tablet, Take 1 tablet by mouth Daily., Disp: , Rfl:     meclizine 25 MG chewable tablet chewable tablet, Chew 1 tablet 3 (Three) Times a Day As Needed (dizziness only when i fly)., Disp: , Rfl:     rosuvastatin (CRESTOR) 10 MG tablet, Take 1 tablet by mouth Daily., Disp: , Rfl:     traZODone (DESYREL) 75 MG half tablet, Take 100 mg by mouth Every Night., Disp: , Rfl:     Current  "Facility-Administered Medications:     lidocaine (XYLOCAINE) 1 % injection 10 mL, 10 mL, Subcutaneous, Once, Zoran Kendrick MD    lidocaine 1% - EPINEPHrine [id number] (XYLOCAINE W/EPI) 1 %-1:[id number] injection 10 mL, 10 mL, Injection, Once, Kelly Myers MD    Objective     Vital Signs   /66 (BP Location: Right arm, Patient Position: Sitting, Cuff Size: Adult)   Pulse 80   Ht 175.3 cm (69\")   Wt 132 kg (290 lb)   SpO2 95%   BMI 42.83 kg/m²      Physical Exam:  General appearance - alert, well appearing, and in no distress  Mental status - normal mood, behavior, speech, dress, motor activity, and thought processes  Eyes - sclera anicteric  Neck - supple, no significant adenopathy  Chest - no tachypnea, retractions or cyanosis  Heart - normal rate and regular rhythm  Breasts - breasts appear normal, no suspicious masses, no skin or nipple changes or axillary nodes; left lumpectomy scar well healed   Neurological - alert, oriented, normal speech, no focal findings or movement disorder noted  Extremities - no pedal edema noted  Skin - normal coloration and turgor, no rashes, no suspicious skin lesions noted    Results Review:  Result Review :            Mammo Diagnostic Digital Tomosynthesis Bilateral With CAD (11/11/2024 13:12)     The parenchymal tissue has a normal and symmetric pattern of  distribution.     There are no suspicious micro calcifications or areas of architectural  distortion.       Summary:  1. Benign mammograms.  2. BI-RADS category 1.  3. Screening mammography is recommended in one year.    Assessment & Plan       Diagnoses and all orders for this visit:    1. Intraductal papilloma of breast, left (Primary)  -     Mammo Screening Digital Tomosynthesis Bilateral With CAD; Future    2. Screening mammogram for breast cancer  -     Mammo Screening Digital Tomosynthesis Bilateral With CAD; Future         Kacie Baron is a 64 y.o. female who presents to the clinic for 6 month breast " follow up. Her most recent imaging was done on 11/11/24 and showed no concerning findings. At this time, the patient is due for screening mammogram in 12 months. I have placed the order for this. She will follow up in office in 1 year for discussion of results and clinical breast exam. She is to call for an appointment sooner if she has any new problems or concerns. She voices understanding and is agreeable to the plan.     Follow up:           Return in about 1 year (around 11/13/2025) for yearly breast .        Venecia Houston PA-C  11/13/24  13:24 CST

## 2025-04-01 ENCOUNTER — TELEPHONE (OUTPATIENT)
Dept: SURGERY | Facility: CLINIC | Age: 65
End: 2025-04-01
Payer: MEDICARE

## 2025-04-01 NOTE — TELEPHONE ENCOUNTER
Attempted to contact pt to RS her appointment due to the provider not being in office. I left PT a detailed VM with the new date/time and our office phone number to call to RS if they needed to.     ECC  4/1/2025

## 2025-05-09 ENCOUNTER — OFFICE VISIT (OUTPATIENT)
Dept: CARDIOLOGY | Facility: CLINIC | Age: 65
End: 2025-05-09
Payer: MEDICARE

## 2025-05-09 VITALS
HEIGHT: 69 IN | HEART RATE: 78 BPM | BODY MASS INDEX: 41.77 KG/M2 | DIASTOLIC BLOOD PRESSURE: 80 MMHG | OXYGEN SATURATION: 97 % | WEIGHT: 282 LBS | SYSTOLIC BLOOD PRESSURE: 122 MMHG

## 2025-05-09 DIAGNOSIS — Z86.79 HISTORY OF CONGESTIVE HEART FAILURE IN ADULTHOOD: Primary | ICD-10-CM

## 2025-05-09 DIAGNOSIS — E66.813 CLASS 3 SEVERE OBESITY DUE TO EXCESS CALORIES WITH SERIOUS COMORBIDITY AND BODY MASS INDEX (BMI) OF 40.0 TO 44.9 IN ADULT: ICD-10-CM

## 2025-05-09 DIAGNOSIS — I10 ESSENTIAL HYPERTENSION: ICD-10-CM

## 2025-05-09 DIAGNOSIS — E66.01 CLASS 3 SEVERE OBESITY DUE TO EXCESS CALORIES WITH SERIOUS COMORBIDITY AND BODY MASS INDEX (BMI) OF 40.0 TO 44.9 IN ADULT: ICD-10-CM

## 2025-05-09 DIAGNOSIS — E78.5 DYSLIPIDEMIA: ICD-10-CM

## 2025-05-09 RX ORDER — TIRZEPATIDE 7.5 MG/.5ML
INJECTION, SOLUTION SUBCUTANEOUS
COMMUNITY
Start: 2025-04-15

## 2025-05-09 NOTE — PROGRESS NOTES
Reason for Visit: cardiovascular follow up.    HPI:  Kacie Baron is a 65 y.o. female is here today for 1 year follow-up.  She follows in cardiology clinic due to history of chronic CHF with normalization of LV function on echo on 11/11/2014.  EF remained normal on echo on 5/1/2024.  Her weight is down 8 pounds compared to November.  She is now on Mounjaro.  She feels like she is going to fall, but does not think it is her balance.  She feels tired and needs to lay down and rest for a while.  She has sleep apnea and is compliant with her mask.  She does her activities of daily living, but does not get much exercise.  She stopped exercising because of back problems over the summer.  She does not heat particularly healthy.      Previous Cardiac Testing and Procedures:  - Echo (6/25/2013) EF 49%, grade 1 diastolic dysfunction, mild to moderate bi-atrial enlargement, normal valves  - LHC (7/9/2013) normal coronary arteries  - Echo (11/11/2014) EF 60%, trace TR, normal RVSP  - Holter monitor (5/20/2021) rare PACs and PVCs, runs of nonsustained SVT, no symptoms reported  - Echo (5/1/2024) EF 61-65%, diastolic dysfunction, mild LA enlargement, normal RV size and function, no significant valve dysfunction    Lab data:  - Lipid panel (12/2/16) 206/35/133/191  - Lipid panel (10/10/17) 214/41/137/178  - Lipid panel (4/28/2020) 196/32/119/224  - Hemoglobin A1c (9/12/2024) 6.7%  - Lipid panel (9/12/2024) total cholesterol 174, HDL 37, LDL 97, triglycerides 233  - BMP (9/12/2024) creatinine 0.68, GFR 96, potassium 4, sodium 142      Patient Active Problem List   Diagnosis    Depression    Anxiety    Class 3 severe obesity due to excess calories with serious comorbidity and body mass index (BMI) of 40.0 to 44.9 in adult    Dyslipidemia    Essential hypertension    Dyspepsia    Hypothyroid    Diabetes    Other sleep apnea    History of congestive heart failure in adulthood    HLD (hyperlipidemia)    Intraductal papilloma of  breast, left       Social History     Tobacco Use    Smoking status: Never     Passive exposure: Never    Smokeless tobacco: Never   Vaping Use    Vaping status: Never Used   Substance Use Topics    Alcohol use: No    Drug use: Never       Family History   Problem Relation Age of Onset    Heart disease Mother     Heart attack Mother     Heart disease Father     Heart attack Father     Hypertension Father     Breast cancer Maternal Grandmother     Breast cancer Paternal Grandmother        The following portions of the patient's history were reviewed and updated as appropriate: allergies, current medications, past family history, past medical history, past social history, past surgical history, and problem list.      Current Outpatient Medications:     aspirin 81 MG EC tablet, Take 1 tablet by mouth Daily., Disp: , Rfl:     carvedilol (COREG) 6.25 MG tablet, Take 1 tablet by mouth 2 (Two) Times a Day With Meals., Disp: 60 tablet, Rfl: 11    Cholecalciferol (VITAMIN D3) 5000 UNITS tablet, Take 1 tablet by mouth Daily., Disp: , Rfl:     FLUoxetine (PROzac) 40 MG capsule, Take 1 capsule by mouth Daily., Disp: , Rfl:     furosemide (LASIX) 20 MG tablet, Take 1 tablet by mouth As Needed (fluid)., Disp: , Rfl:     Jardiance 25 MG tablet tablet, Take 1 tablet by mouth Daily., Disp: , Rfl:     levothyroxine (SYNTHROID, LEVOTHROID) 150 MCG tablet, Take 1 tablet by mouth Daily., Disp: , Rfl:     meclizine 25 MG chewable tablet chewable tablet, Chew 1 tablet 3 (Three) Times a Day As Needed (dizziness only when i fly)., Disp: , Rfl:     Mounjaro 7.5 MG/0.5ML solution auto-injector, ADMINISTER 7.5 MG UNDER THE SKIN EVERY WEEK, Disp: , Rfl:     rosuvastatin (CRESTOR) 10 MG tablet, Take 1 tablet by mouth Daily., Disp: , Rfl:     traZODone (DESYREL) 75 MG half tablet, Take 100 mg by mouth Every Night., Disp: , Rfl:     Current Facility-Administered Medications:     lidocaine (XYLOCAINE) 1 % injection 10 mL, 10 mL, Subcutaneous, Once,  "Zoran Kendrick MD    lidocaine 1% - EPINEPHrine 1:751193 (XYLOCAINE W/EPI) 1 %-1:730908 injection 10 mL, 10 mL, Injection, Once, Kelly Myers MD    Review of Systems   Constitutional: Positive for weight loss. Negative for chills and fever.   Cardiovascular:  Negative for chest pain and paroxysmal nocturnal dyspnea.   Respiratory:  Negative for cough and shortness of breath.    Skin:  Negative for rash.   Musculoskeletal:  Positive for arthritis and joint pain.   Gastrointestinal:  Negative for abdominal pain and heartburn.   Neurological:  Negative for dizziness and numbness.       Objective   /80 (BP Location: Left arm, Patient Position: Sitting, Cuff Size: Adult)   Pulse 78   Ht 175.3 cm (69.02\")   Wt 128 kg (282 lb)   SpO2 97%   BMI 41.62 kg/m²   Constitutional:       Appearance: Well-developed. Morbidly obese.   HENT:      Head: Normocephalic and atraumatic.   Pulmonary:      Effort: Pulmonary effort is normal.      Breath sounds: Normal breath sounds.   Cardiovascular:      Normal rate. Regular rhythm.   Edema:     Peripheral edema absent.   Skin:     General: Skin is warm and dry.   Neurological:      Mental Status: Alert and oriented to person, place, and time.         ECG 12 Lead    Date/Time: 5/9/2025 10:50 AM  Performed by: Guanaco Baugh MD    Authorized by: Guanaco Baugh MD  Comparison: compared with previous ECG from 4/26/2024  Rhythm: sinus rhythm  Rate: normal    Clinical impression: normal ECG            ICD-10-CM ICD-9-CM   1. History of congestive heart failure in adulthood  Z86.79 V12.59   2. Dyslipidemia  E78.5 272.4   3. Class 3 severe obesity due to excess calories with serious comorbidity and body mass index (BMI) of 40.0 to 44.9 in adult  E66.813 278.01    E66.01 V85.41    Z68.41    4. Essential hypertension  I10 401.9         Assessment/Plan:  1. Congestive heart failure: History of systolic CHF that normalized.  LV systolic function remains normal with EF of 61 to " 65% on repeat echo on 5/1/2024.  Continue carvedilol, Jardiance, and furosemide PRN.     2. Dyslipidemia: Acceptable control of LDL with low HDL and high triglycerides on lipid panel on 9/12/2024.  Continue rosuvastatin.    3. Obesity: Body mass index is 41.62 kg/m².  Weight is down by 8 pounds compared to last visit.  Continues to  on lifestyle modification and weight loss.     4. Essential hypertension: Remains well-controlled on carvedilol.

## 2025-05-09 NOTE — LETTER
May 9, 2025     Pernell Mccoy MD  [address] [facility] [address]    Patient: Kacie Baron   YOB: 1960   Date of Visit: 5/9/2025       Dear Pernell Mccoy MD    Kacie Baron was in my office today. Below is a copy of my note.    If you have questions, please do not hesitate to call me. I look forward to following Kacie along with you.         Sincerely,        Guanaco Baugh MD        CC: No Recipients      Reason for Visit: cardiovascular follow up.    HPI:  Kacie Baron is a 65 y.o. female is here today for 1 year follow-up.  She follows in cardiology clinic due to history of chronic CHF with normalization of LV function on echo on 11/11/2014.  EF remained normal on echo on 5/1/2024.  Her weight is down 8 pounds compared to November.  She is now on Mounjaro.  She feels like she is going to fall, but does not think it is her balance.  She feels tired and needs to lay down and rest for a while.  She has sleep apnea and is compliant with her mask.  She does her activities of daily living, but does not get much exercise.  She stopped exercising because of back problems over the summer.  She does not heat particularly healthy.      Previous Cardiac Testing and Procedures:  - Echo (6/25/2013) EF 49%, grade 1 diastolic dysfunction, mild to moderate bi-atrial enlargement, normal valves  - LHC (7/9/2013) normal coronary arteries  - Echo (11/11/2014) EF 60%, trace TR, normal RVSP  - Holter monitor (5/20/2021) rare PACs and PVCs, runs of nonsustained SVT, no symptoms reported  - Echo (5/1/2024) EF 61-65%, diastolic dysfunction, mild LA enlargement, normal RV size and function, no significant valve dysfunction    Lab data:  - Lipid panel (12/2/16) 206/35/133/191  - Lipid panel (10/10/17) 214/41/137/178  - Lipid panel (4/28/2020) 196/32/119/224  - Hemoglobin A1c (9/12/2024) 6.7%  - Lipid panel (9/12/2024) total cholesterol 174, HDL 37, LDL 97, triglycerides 233  - BMP (9/12/2024) creatinine  0.68, GFR 96, potassium 4, sodium 142      Patient Active Problem List   Diagnosis   • Depression   • Anxiety   • Class 3 severe obesity due to excess calories with serious comorbidity and body mass index (BMI) of 40.0 to 44.9 in adult   • Dyslipidemia   • Essential hypertension   • Dyspepsia   • Hypothyroid   • Diabetes   • Other sleep apnea   • History of congestive heart failure in adulthood   • HLD (hyperlipidemia)   • Intraductal papilloma of breast, left       Social History     Tobacco Use   • Smoking status: Never     Passive exposure: Never   • Smokeless tobacco: Never   Vaping Use   • Vaping status: Never Used   Substance Use Topics   • Alcohol use: No   • Drug use: Never       Family History   Problem Relation Age of Onset   • Heart disease Mother    • Heart attack Mother    • Heart disease Father    • Heart attack Father    • Hypertension Father    • Breast cancer Maternal Grandmother    • Breast cancer Paternal Grandmother        The following portions of the patient's history were reviewed and updated as appropriate: allergies, current medications, past family history, past medical history, past social history, past surgical history, and problem list.      Current Outpatient Medications:   •  aspirin 81 MG EC tablet, Take 1 tablet by mouth Daily., Disp: , Rfl:   •  carvedilol (COREG) 6.25 MG tablet, Take 1 tablet by mouth 2 (Two) Times a Day With Meals., Disp: 60 tablet, Rfl: 11  •  Cholecalciferol (VITAMIN D3) 5000 UNITS tablet, Take 1 tablet by mouth Daily., Disp: , Rfl:   •  FLUoxetine (PROzac) 40 MG capsule, Take 1 capsule by mouth Daily., Disp: , Rfl:   •  furosemide (LASIX) 20 MG tablet, Take 1 tablet by mouth As Needed (fluid)., Disp: , Rfl:   •  Jardiance 25 MG tablet tablet, Take 1 tablet by mouth Daily., Disp: , Rfl:   •  levothyroxine (SYNTHROID, LEVOTHROID) 150 MCG tablet, Take 1 tablet by mouth Daily., Disp: , Rfl:   •  meclizine 25 MG chewable tablet chewable tablet, Chew 1 tablet 3  "(Three) Times a Day As Needed (dizziness only when i fly)., Disp: , Rfl:   •  Mounjaro 7.5 MG/0.5ML solution auto-injector, ADMINISTER 7.5 MG UNDER THE SKIN EVERY WEEK, Disp: , Rfl:   •  rosuvastatin (CRESTOR) 10 MG tablet, Take 1 tablet by mouth Daily., Disp: , Rfl:   •  traZODone (DESYREL) 75 MG half tablet, Take 100 mg by mouth Every Night., Disp: , Rfl:     Current Facility-Administered Medications:   •  lidocaine (XYLOCAINE) 1 % injection 10 mL, 10 mL, Subcutaneous, Once, Zoran Kendrick MD  •  lidocaine 1% - EPINEPHrine 1:162533 (XYLOCAINE W/EPI) 1 %-1:398106 injection 10 mL, 10 mL, Injection, Once, Kelly Myers MD    Review of Systems   Constitutional: Positive for weight loss. Negative for chills and fever.   Cardiovascular:  Negative for chest pain and paroxysmal nocturnal dyspnea.   Respiratory:  Negative for cough and shortness of breath.    Skin:  Negative for rash.   Musculoskeletal:  Positive for arthritis and joint pain.   Gastrointestinal:  Negative for abdominal pain and heartburn.   Neurological:  Negative for dizziness and numbness.       Objective  /80 (BP Location: Left arm, Patient Position: Sitting, Cuff Size: Adult)   Pulse 78   Ht 175.3 cm (69.02\")   Wt 128 kg (282 lb)   SpO2 97%   BMI 41.62 kg/m²   Constitutional:       Appearance: Well-developed. Morbidly obese.   HENT:      Head: Normocephalic and atraumatic.   Pulmonary:      Effort: Pulmonary effort is normal.      Breath sounds: Normal breath sounds.   Cardiovascular:      Normal rate. Regular rhythm.   Edema:     Peripheral edema absent.   Skin:     General: Skin is warm and dry.   Neurological:      Mental Status: Alert and oriented to person, place, and time.         ECG 12 Lead    Date/Time: 5/9/2025 10:50 AM  Performed by: Guanaco Baugh MD    Authorized by: Guanaco Baugh MD  Comparison: compared with previous ECG from 4/26/2024  Rhythm: sinus rhythm  Rate: normal    Clinical impression: normal " ECG            ICD-10-CM ICD-9-CM   1. History of congestive heart failure in adulthood  Z86.79 V12.59   2. Dyslipidemia  E78.5 272.4   3. Class 3 severe obesity due to excess calories with serious comorbidity and body mass index (BMI) of 40.0 to 44.9 in adult  E66.813 278.01    E66.01 V85.41    Z68.41    4. Essential hypertension  I10 401.9         Assessment/Plan:  1. Congestive heart failure: History of systolic CHF that normalized.  LV systolic function remains normal with EF of 61 to 65% on repeat echo on 5/1/2024.  Continue carvedilol, Jardiance, and furosemide PRN.     2. Dyslipidemia: Acceptable control of LDL with low HDL and high triglycerides on lipid panel on 9/12/2024.  Continue rosuvastatin.    3. Obesity: Body mass index is 41.62 kg/m².  Weight is down by 8 pounds compared to last visit.  Continues to  on lifestyle modification and weight loss.     4. Essential hypertension: Remains well-controlled on carvedilol.

## (undated) DEVICE — 3M™ IOBAN™ 2 ANTIMICROBIAL INCISE DRAPE 6650EZ: Brand: IOBAN™ 2

## (undated) DEVICE — 4-PORT MANIFOLD: Brand: NEPTUNE 2

## (undated) DEVICE — SUT SILK 2/0 SH 30IN K833H

## (undated) DEVICE — SUT MNCRYL 4/0 PS2 27IN UD MCP426H

## (undated) DEVICE — SYR PRECISIONGLIDE LL 5CC 20X1 1/2IN

## (undated) DEVICE — ELECTRD BLD EZ CLN MOD XLNG 2.75IN

## (undated) DEVICE — APPL CHLORAPREP HI/LITE 26ML ORNG

## (undated) DEVICE — DRSNG SURESITE WNDW 4X4.5

## (undated) DEVICE — PAD MINOR UNIVERSAL: Brand: MEDLINE INDUSTRIES, INC.

## (undated) DEVICE — NDL HYPO PRECISIONGLIDE REG 25G 1 1/2

## (undated) DEVICE — PAD,NON-ADHERENT,3X8,STERILE,LF,1/PK: Brand: MEDLINE

## (undated) DEVICE — FRCP GRASP BABCOCK 6IN DISP STRL

## (undated) DEVICE — GLV SURG SENSICARE W/ALOE PF LF 6.5 STRL

## (undated) DEVICE — ANTIBACTERIAL UNDYED BRAIDED (POLYGLACTIN 910), SYNTHETIC ABSORBABLE SUTURE: Brand: COATED VICRYL

## (undated) DEVICE — SYRINGE,CONTROL,LL,FINGER,GRIP: Brand: MEDLINE INDUSTRIES, INC.

## (undated) DEVICE — GLOVE,SURG,SENSICARE,ALOE,LF,PF,6: Brand: MEDLINE

## (undated) DEVICE — STERILE (14X122CM) TELESCOPICALLY-FOLDED COVER: Brand: CIV-CLEAR™ TRANSDUCER COVER

## (undated) DEVICE — DISPOSABLE SPECIMEN RADIOGRAPHY SYSTEM WITH LOCALIZING GRID, 4.65" RADIOLUCENT GRID: Brand: ACCUGRID

## (undated) DEVICE — TRAP FLD MINIVAC MEGADYNE 100ML

## (undated) DEVICE — ADHS SKIN PREMIERPRO EXOFIN TOPICAL HI/VISC .5ML